# Patient Record
Sex: MALE | Race: WHITE | NOT HISPANIC OR LATINO | Employment: FULL TIME | URBAN - METROPOLITAN AREA
[De-identification: names, ages, dates, MRNs, and addresses within clinical notes are randomized per-mention and may not be internally consistent; named-entity substitution may affect disease eponyms.]

---

## 2019-06-10 ENCOUNTER — OFFICE VISIT (OUTPATIENT)
Dept: FAMILY MEDICINE | Facility: CLINIC | Age: 24
End: 2019-06-10
Payer: COMMERCIAL

## 2019-06-10 VITALS
HEART RATE: 62 BPM | OXYGEN SATURATION: 99 % | TEMPERATURE: 98.2 F | DIASTOLIC BLOOD PRESSURE: 93 MMHG | WEIGHT: 200.5 LBS | SYSTOLIC BLOOD PRESSURE: 153 MMHG

## 2019-06-10 DIAGNOSIS — F32.A DEPRESSION, UNSPECIFIED DEPRESSION TYPE: Primary | ICD-10-CM

## 2019-06-10 DIAGNOSIS — R03.0 ELEVATED BLOOD PRESSURE READING WITHOUT DIAGNOSIS OF HYPERTENSION: ICD-10-CM

## 2019-06-10 RX ORDER — BUPROPION HYDROCHLORIDE 150 MG/1
150 TABLET ORAL EVERY MORNING
Qty: 30 TABLET | Refills: 1 | Status: SHIPPED | OUTPATIENT
Start: 2019-06-10 | End: 2019-07-03

## 2019-06-10 ASSESSMENT — ANXIETY QUESTIONNAIRES
2. NOT BEING ABLE TO STOP OR CONTROL WORRYING: MORE THAN HALF THE DAYS
7. FEELING AFRAID AS IF SOMETHING AWFUL MIGHT HAPPEN: MORE THAN HALF THE DAYS
1. FEELING NERVOUS, ANXIOUS, OR ON EDGE: MORE THAN HALF THE DAYS
5. BEING SO RESTLESS THAT IT IS HARD TO SIT STILL: NEARLY EVERY DAY
IF YOU CHECKED OFF ANY PROBLEMS ON THIS QUESTIONNAIRE, HOW DIFFICULT HAVE THESE PROBLEMS MADE IT FOR YOU TO DO YOUR WORK, TAKE CARE OF THINGS AT HOME, OR GET ALONG WITH OTHER PEOPLE: VERY DIFFICULT
GAD7 TOTAL SCORE: 14
3. WORRYING TOO MUCH ABOUT DIFFERENT THINGS: SEVERAL DAYS
6. BECOMING EASILY ANNOYED OR IRRITABLE: MORE THAN HALF THE DAYS

## 2019-06-10 ASSESSMENT — PAIN SCALES - GENERAL: PAINLEVEL: NO PAIN (0)

## 2019-06-10 ASSESSMENT — PATIENT HEALTH QUESTIONNAIRE - PHQ9
SUM OF ALL RESPONSES TO PHQ QUESTIONS 1-9: 15
5. POOR APPETITE OR OVEREATING: MORE THAN HALF THE DAYS

## 2019-06-10 NOTE — PROGRESS NOTES
HPI       Sergio Harrington is a 23 year old male with a past medical history significant for asthma who presents for     Chief Complaint   Patient presents with     MOOD CHANGES     Pt is having ongoing mood changes.      Depression:  Sergio presents today to discuss depressed mood. 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 Reports he did visit the mental health clinic at [facility] during his undergraduate program for similar symptoms and he employed non-pharmacological therapies to manage depression at that time.     Sergio reports that his wife and his boss have noticed a change in his mood. Admits he wasn't spending as much time at work and his boss confronted him about it. While not at work he'll read, considers himself a bookworm, uses this to cope. Has tried yoga and meditation as well. Sergio admits that he has been hesitant to start medication until recently, he is open to it at this time. His wife struggles with depression as well and is on managed on bupropion. Sergio denies thoughts of self harm ot harm to others.     Sergio reports exercising 4-5 times weekly, usually includes cardio and weights. He sleeps 8 hours/night.     Elevated blood pressure:  Sergio reports previous high blood pressure readings at the eye doctor and dentist. He did have this evaluated about 2 months ago at which time the provider he saw recommended monitoring his BP at home. He acquired a home BP cuff and has been checking his BP at least three times weekly. Reports his readings are round 120/80.     Past Medical History:   Diagnosis Date     Asthma      Past Surgical History:   Procedure Laterality Date     HC TOOTH EXTRACTION W/FORCEP       Family History   Problem Relation Age of Onset     No Known Problems Mother      No Known Problems Father       No Known Problems Sister      Atrial fibrillation Maternal Grandmother      Pacemaker Maternal Grandmother      Melanoma Maternal Grandmother      Myocardial Infarction Maternal Grandfather      Kidney Disease Maternal Grandfather      Dementia Paternal Grandmother      Diabetes Type 2  Paternal Grandfather      Social History     Socioeconomic History     Marital status:      Spouse name: Arnold     Number of children: 0     Years of education: 17     Highest education level: Bachelor's degree (e.g., BA, AB, BS)   Occupational History     Not on file   Social Needs     Financial resource strain: Not on file     Food insecurity:     Worry: Not on file     Inability: Not on file     Transportation needs:     Medical: Not on file     Non-medical: Not on file   Tobacco Use     Smoking status: Never Smoker     Smokeless tobacco: Never Used   Substance and Sexual Activity     Alcohol use: Yes     Comment: 3-4 times monthly, 1-2 beers each time     Drug use: Never     Sexual activity: Yes     Partners: Female     Birth control/protection: IUD   Lifestyle     Physical activity:     Days per week: Not on file     Minutes per session: Not on file     Stress: Not on file   Relationships     Social connections:     Talks on phone: Not on file     Gets together: Not on file     Attends Yazidism service: Not on file     Active member of club or organization: Not on file     Attends meetings of clubs or organizations: Not on file     Relationship status: Not on file     Intimate partner violence:     Fear of current or ex partner: Not on file     Emotionally abused: Not on file     Physically abused: Not on file     Forced sexual activity: Not on file   Other Topics Concern     Not on file   Social History Narrative    Grew up in [place], [place].        Current Outpatient Medications   Medication     buPROPion (WELLBUTRIN XL) 150 MG 24 hr tablet     No current facility-administered medications for this visit.       No  Known Allergies      Problem, Medication and Allergy Lists were reviewed and updated if needed..    Patient is a new patient to this clinic and so  I reviewed/updated the Past Medical History, the Family History and the Social History .         Review of Systems:   Review of Systems     ROS: 10 point ROS neg other than the symptoms noted above in the HPI.         Physical Exam:     Vitals:    06/10/19 1306   BP: (!) 153/93   Pulse: 62   Temp: 98.2  F (36.8  C)   SpO2: 99%   Weight: 90.9 kg (200 lb 8 oz)     There is no height or weight on file to calculate BMI.  Vital signs normal except elevated blood pressure.     Physical Exam   Constitutional: He is oriented to person, place, and time. He appears well-developed and well-nourished. No distress.   Cardiovascular: Normal rate.   Pulmonary/Chest: Effort normal. No respiratory distress.   Neurological: He is alert and oriented to person, place, and time.   Flushing to neck noted, possibly secondary to anxiety while in clinic.    Skin: Skin is warm and dry.   Psychiatric: He has a normal mood and affect. His behavior is normal.       Results:     No laboratory testing was completed at today's visit.    Assessment and Plan      1. Depression, unspecified depression type  Comment: Pt with history of depressed mood previously managed with non-pharmacological therapies including yoga, meditation. 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 We discussed SSRIs today as well as bupropion. Through shared decision making, it was decided to start bupropion  mg daily with close follow-up.   Plan:   - buPROPion (WELLBUTRIN XL) 150 MG 24 hr tablet; Take 1 tablet (150 mg) by mouth every morning  Dispense: 30 tablet; Refill: 1   - Counseled on how to take this medication and possible adverse side effects   - Continue regular exercise   - Follow-up in 4 weeks, sooner if  concerns    2. Elevated blood pressure reading without diagnosis of hypertension  Comment: Pt with elevated BP reading today in clinic without prior history of hypertension. Reports this is not new for him and he recently had this evaluated in clinic be alternate provider who recommended home monitoring of BP. Pt has been checking BP three times weekly, readings reported to be around 120/80.   Plan:   - Continue to monitor BP at home   - Return to clinic if BP readings consistently >130/80    Follow-up: Return to clinic in 4 weeks for depression follow-up and medication management, sooner if concerns.      There are no discontinued medications.    Options for treatment and follow-up care were reviewed with the patient. Sergio Harrington  engaged in the decision making process and verbalized understanding of the options discussed and agreed with the final plan.    DARIA Read CNP

## 2019-06-10 NOTE — NURSING NOTE
23 year old  Chief Complaint   Patient presents with     MOOD CHANGES     Pt is having ongoing mood changes.        Blood pressure (!) 153/93, pulse 62, temperature 98.2  F (36.8  C), weight 90.9 kg (200 lb 8 oz), SpO2 99 %. There is no height or weight on file to calculate BMI.  BP completed using cuff size:    There is no problem list on file for this patient.      Wt Readings from Last 2 Encounters:   06/10/19 90.9 kg (200 lb 8 oz)     BP Readings from Last 3 Encounters:   06/10/19 (!) 153/93       No Known Allergies    No current outpatient medications on file.     No current facility-administered medications for this visit.        Social History     Tobacco Use     Smoking status: Never Smoker     Smokeless tobacco: Never Used   Substance Use Topics     Alcohol use: Yes     Drug use: None       Honoring Choices - Health Care Directive Guide offered to patient at time of visit.    Health Maintenance Due   Topic Date Due     PREVENTIVE CARE VISIT  1994     DTAP/TDAP/TD IMMUNIZATION (1 - Tdap) 10/10/2001     HIV SCREENING  10/10/2009     PHQ-2  01/01/2019         There is no immunization history on file for this patient.    No results found for: PAP      No lab results found.    PHQ-2 ( 1999 Pfizer) 6/10/2019   Q1: Little interest or pleasure in doing things 2   Q2: Feeling down, depressed or hopeless 1   PHQ-2 Score 3       No flowsheet data found.    No flowsheet data found.    No flowsheet data found.      Felisha De Leon, [facility]  Tameka 10, 2019 1:13 PM

## 2019-06-10 NOTE — PATIENT INSTRUCTIONS
[facility] Medication Refill Request Information:  * Please contact your pharmacy regarding ANY request for medication refills.  ** NP Clinic Prescription Fax = [phone]  * Please allow 3 business days for routine medication refills.  * Please allow 5 business days for controlled substance medication refills.     [facility] Test Result notification information:  *You will be notified with in 7-10 days of your appointment day regarding the results of your test.  If you are on MyChart you will be notified as soon as the provider has reviewed the results and signed off on them.    [facility]: [phone] or [phone] ([facility] clinic location)

## 2019-06-11 ASSESSMENT — ANXIETY QUESTIONNAIRES: GAD7 TOTAL SCORE: 14

## 2019-06-30 ASSESSMENT — ANXIETY QUESTIONNAIRES
7. FEELING AFRAID AS IF SOMETHING AWFUL MIGHT HAPPEN: NOT AT ALL
5. BEING SO RESTLESS THAT IT IS HARD TO SIT STILL: NOT AT ALL
1. FEELING NERVOUS, ANXIOUS, OR ON EDGE: SEVERAL DAYS
2. NOT BEING ABLE TO STOP OR CONTROL WORRYING: NOT AT ALL
4. TROUBLE RELAXING: SEVERAL DAYS
GAD7 TOTAL SCORE: 3
6. BECOMING EASILY ANNOYED OR IRRITABLE: NOT AT ALL
7. FEELING AFRAID AS IF SOMETHING AWFUL MIGHT HAPPEN: NOT AT ALL
GAD7 TOTAL SCORE: 3
3. WORRYING TOO MUCH ABOUT DIFFERENT THINGS: SEVERAL DAYS

## 2019-07-03 ENCOUNTER — OFFICE VISIT (OUTPATIENT)
Dept: FAMILY MEDICINE | Facility: CLINIC | Age: 24
End: 2019-07-03
Payer: COMMERCIAL

## 2019-07-03 VITALS
RESPIRATION RATE: 16 BRPM | HEART RATE: 90 BPM | SYSTOLIC BLOOD PRESSURE: 155 MMHG | OXYGEN SATURATION: 98 % | DIASTOLIC BLOOD PRESSURE: 80 MMHG | BODY MASS INDEX: 30.74 KG/M2 | TEMPERATURE: 98.3 F | HEIGHT: 68 IN | WEIGHT: 202.8 LBS

## 2019-07-03 DIAGNOSIS — R03.0 ELEVATED BLOOD PRESSURE READING WITHOUT DIAGNOSIS OF HYPERTENSION: ICD-10-CM

## 2019-07-03 DIAGNOSIS — F32.A DEPRESSION, UNSPECIFIED DEPRESSION TYPE: Primary | ICD-10-CM

## 2019-07-03 RX ORDER — BUPROPION HYDROCHLORIDE 300 MG/1
300 TABLET ORAL EVERY MORNING
Qty: 30 TABLET | Refills: 1 | Status: SHIPPED | OUTPATIENT
Start: 2019-07-03 | End: 2019-09-03 | Stop reason: DRUGHIGH

## 2019-07-03 ASSESSMENT — PATIENT HEALTH QUESTIONNAIRE - PHQ9
5. POOR APPETITE OR OVEREATING: SEVERAL DAYS
SUM OF ALL RESPONSES TO PHQ QUESTIONS 1-9: 4

## 2019-07-03 ASSESSMENT — ANXIETY QUESTIONNAIRES
7. FEELING AFRAID AS IF SOMETHING AWFUL MIGHT HAPPEN: NOT AT ALL
1. FEELING NERVOUS, ANXIOUS, OR ON EDGE: SEVERAL DAYS
5. BEING SO RESTLESS THAT IT IS HARD TO SIT STILL: NOT AT ALL
6. BECOMING EASILY ANNOYED OR IRRITABLE: SEVERAL DAYS
2. NOT BEING ABLE TO STOP OR CONTROL WORRYING: NOT AT ALL
GAD7 TOTAL SCORE: 4
IF YOU CHECKED OFF ANY PROBLEMS ON THIS QUESTIONNAIRE, HOW DIFFICULT HAVE THESE PROBLEMS MADE IT FOR YOU TO DO YOUR WORK, TAKE CARE OF THINGS AT HOME, OR GET ALONG WITH OTHER PEOPLE: SOMEWHAT DIFFICULT
3. WORRYING TOO MUCH ABOUT DIFFERENT THINGS: SEVERAL DAYS

## 2019-07-03 ASSESSMENT — PAIN SCALES - GENERAL: PAINLEVEL: NO PAIN (0)

## 2019-07-03 ASSESSMENT — MIFFLIN-ST. JEOR: SCORE: 1884.39

## 2019-07-03 NOTE — PROGRESS NOTES
HPI       Sergio Harrington is a 23 year old male with a past medical history significant for asthma and depression who presents for     Chief Complaint   Patient presents with     Recheck Medication     Pt is here for a follow up to discuss medications.     Sergio presents today for depression follow-up and medication management. Three weeks ago he was started on bupropion  mg daily.     Sergio reports he has been taking the medication as prescribed in the morning. He has not missed a dose. Notes that after initially starting the medication he had a difficult time falling asleep at night, laid awake longer than usual before falling asleep. This lasted about 3 nights. Denies ever having sleep disruptions since starting the medication. He generally goes to bed around 830-9 pm and wakes up at 530 am.      Sergio reports his depression seemed to notably improve for two weeks following initiation of bupropion, described as more talkative and friendly. His wife noticed an improvement as well. Over the past week he has noted a plateau or possibly some increased depression that is still better managed when compared to prior to starting medication. He describes his symptoms as irritability.     Patient reports work is going really well. 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 He has no concerns regarding his performance at work.    Elevated blood pressure:  Patient reports he has been checking his BP at home, readings are consistently <130/80.     PHQ-9 SCORE 6/10/2019   PHQ-9 Total Score 15     ALLYSSA-7 SCORE 6/10/2019 6/30/2019   Total Score - 3 (minimal anxiety)   Total Score 14 3       Past Medical History:   Diagnosis Date     Asthma      Depression      Current Outpatient Medications   Medication     buPROPion (WELLBUTRIN XL) 150 MG 24 hr tablet     No current facility-administered medications for this visit.       No Known Allergies      Problem, Medication and Allergy  "Lists were reviewed and updated if needed..    Patient is an established patient of this clinic..         Review of Systems:   Review of Systems     ROS: 10 point ROS neg other than the symptoms noted above in the HPI.         Physical Exam:     Vitals:    07/03/19 0826 07/03/19 0828   BP: 147/90 155/80   BP Location: Right arm    Patient Position: Sitting    Cuff Size: Adult Large    Pulse: 90    Resp: 16    Temp: 98.3  F (36.8  C)    TempSrc: Oral    SpO2: 98%    Weight: 92 kg (202 lb 12.8 oz)    Height: 1.727 m (5' 8\")      Body mass index is 30.84 kg/m .  Vital signs normal except elevated blood pressure.     Physical Exam   Constitutional: He is oriented to person, place, and time. He appears well-developed and well-nourished. No distress.   Cardiovascular: Normal rate, regular rhythm and normal heart sounds. Exam reveals no gallop and no friction rub.   No murmur heard.  Neurological: He is alert and oriented to person, place, and time.   Skin: Skin is warm and dry.   Psychiatric: He has a normal mood and affect. His behavior is normal.       Results:     No laboratory testing was completed at today's visit.    Assessment and Plan      1. Depression, unspecified depression type  Comment: Improving. Pt with hx of depression started on bupropion  mg three weeks ago, has been doing better. Reports symptoms improved for two weeks following initiation of medication but feels he has plateaued over the past week with possibly some increased depression. Pt is interested in a dose increase today. Plan to increase to bupropion  mg daily with follow-up in 4 weeks.   PHQ-9 SCORE 6/10/2019 7/3/2019   PHQ-9 Total Score 15 4     ALLYSSA-7 SCORE 6/10/2019 6/30/2019 7/3/2019   Total Score - 3 (minimal anxiety) -   Total Score 14 3 4   Plan:   - buPROPion (WELLBUTRIN XL) 300 MG 24 hr tablet; Take 1 tablet (300 mg) by mouth every morning  Dispense: 30 tablet; Refill: 1   - Monitor for sleep disruptions   - Monitor " depression symptoms   - Follow-up in 4 weeks, sooner if concerns    2. Elevated blood pressure reading without diagnosis of hypertension  Comment: Pt with hx of elevated BP readings while in clinic. Patient reports he has been checking his BP at home, readings are consistently <130/80.   Plan:   - Continue to monitor    Follow-up: Return to clinic in 4 weeks, sooner if concerns.     Medications Discontinued During This Encounter   Medication Reason     buPROPion (WELLBUTRIN XL) 150 MG 24 hr tablet Reorder     Options for treatment and follow-up care were reviewed with the patient. Sergio Harrington  engaged in the decision making process and verbalized understanding of the options discussed and agreed with the final plan.    DARIA Read CNP  Answers for HPI/ROS submitted by the patient on 6/30/2019   ALLYSSA 7 TOTAL SCORE: 3

## 2019-07-03 NOTE — NURSING NOTE
Chief Complaint   Patient presents with     Recheck Medication     Pt is here for a follow up to discuss medications.         Cyrus Hernández, EMT on 7/3/2019 at 8:26 AM

## 2019-07-03 NOTE — PATIENT INSTRUCTIONS

## 2019-09-03 ENCOUNTER — OFFICE VISIT (OUTPATIENT)
Dept: FAMILY MEDICINE | Facility: CLINIC | Age: 24
End: 2019-09-03
Payer: COMMERCIAL

## 2019-09-03 VITALS
TEMPERATURE: 98.9 F | OXYGEN SATURATION: 99 % | HEART RATE: 90 BPM | SYSTOLIC BLOOD PRESSURE: 177 MMHG | BODY MASS INDEX: 31.39 KG/M2 | HEIGHT: 67 IN | WEIGHT: 200 LBS | RESPIRATION RATE: 16 BRPM | DIASTOLIC BLOOD PRESSURE: 103 MMHG

## 2019-09-03 DIAGNOSIS — R03.0 WHITE COAT SYNDROME WITHOUT DIAGNOSIS OF HYPERTENSION: ICD-10-CM

## 2019-09-03 DIAGNOSIS — F32.A DEPRESSION, UNSPECIFIED DEPRESSION TYPE: Primary | ICD-10-CM

## 2019-09-03 RX ORDER — BUPROPION HYDROCHLORIDE 450 MG/1
450 TABLET, FILM COATED, EXTENDED RELEASE ORAL DAILY
Qty: 14 TABLET | Refills: 0 | Status: SHIPPED | OUTPATIENT
Start: 2019-09-03 | End: 2019-09-16

## 2019-09-03 ASSESSMENT — ENCOUNTER SYMPTOMS
SHORTNESS OF BREATH: 0
CHEST TIGHTNESS: 0
SLEEP DISTURBANCE: 0
HEADACHES: 0
ACTIVITY CHANGE: 0
NERVOUS/ANXIOUS: 0
PALPITATIONS: 0
DIZZINESS: 0
SEIZURES: 0
LIGHT-HEADEDNESS: 0

## 2019-09-03 ASSESSMENT — ANXIETY QUESTIONNAIRES
GAD7 TOTAL SCORE: 4
IF YOU CHECKED OFF ANY PROBLEMS ON THIS QUESTIONNAIRE, HOW DIFFICULT HAVE THESE PROBLEMS MADE IT FOR YOU TO DO YOUR WORK, TAKE CARE OF THINGS AT HOME, OR GET ALONG WITH OTHER PEOPLE: SOMEWHAT DIFFICULT
2. NOT BEING ABLE TO STOP OR CONTROL WORRYING: NOT AT ALL
1. FEELING NERVOUS, ANXIOUS, OR ON EDGE: SEVERAL DAYS
7. FEELING AFRAID AS IF SOMETHING AWFUL MIGHT HAPPEN: NOT AT ALL
5. BEING SO RESTLESS THAT IT IS HARD TO SIT STILL: NOT AT ALL
3. WORRYING TOO MUCH ABOUT DIFFERENT THINGS: SEVERAL DAYS
6. BECOMING EASILY ANNOYED OR IRRITABLE: SEVERAL DAYS

## 2019-09-03 ASSESSMENT — PATIENT HEALTH QUESTIONNAIRE - PHQ9
5. POOR APPETITE OR OVEREATING: SEVERAL DAYS
SUM OF ALL RESPONSES TO PHQ QUESTIONS 1-9: 4

## 2019-09-03 ASSESSMENT — MIFFLIN-ST. JEOR: SCORE: 1858.99

## 2019-09-03 NOTE — NURSING NOTE
"23 year old  Chief Complaint   Patient presents with     Depression     F/U       Blood pressure (!) 177/103, pulse 90, temperature 98.9  F (37.2  C), temperature source Oral, resp. rate 16, height 1.707 m (5' 7.2\"), weight 90.7 kg (200 lb), SpO2 99 %. Body mass index is 31.14 kg/m .  BP completed using cuff size:    There is no problem list on file for this patient.      Wt Readings from Last 2 Encounters:   09/03/19 90.7 kg (200 lb)   07/03/19 92 kg (202 lb 12.8 oz)     BP Readings from Last 3 Encounters:   09/03/19 (!) 177/103   07/03/19 155/80   06/10/19 (!) 153/93       No Known Allergies    Current Outpatient Medications   Medication     buPROPion (WELLBUTRIN XL) 300 MG 24 hr tablet     No current facility-administered medications for this visit.        Social History     Tobacco Use     Smoking status: Never Smoker     Smokeless tobacco: Never Used   Substance Use Topics     Alcohol use: Yes     Comment: 3-4 times monthly, 1-2 beers each time     Drug use: Never       Honoring Choices - Health Care Directive Guide offered to patient at time of visit.    Health Maintenance Due   Topic Date Due     PREVENTIVE CARE VISIT  1994     DEPRESSION ACTION PLAN  1994     DTAP/TDAP/TD IMMUNIZATION (1 - Tdap) 10/10/2001     HIV SCREENING  10/10/2009     INFLUENZA VACCINE (1) 09/01/2019         There is no immunization history on file for this patient.    No results found for: PAP      No lab results found.    PHQ-2 ( 1999 Pfizer) 9/3/2019 6/30/2019   Q1: Little interest or pleasure in doing things 1 1   Q2: Feeling down, depressed or hopeless 0 0   PHQ-2 Score 1 1   Q1: Little interest or pleasure in doing things - Several days   Q2: Feeling down, depressed or hopeless - Not at all   PHQ-2 Score - 1       PHQ-9 SCORE 6/10/2019 7/3/2019 9/3/2019   PHQ-9 Total Score 15 4 4       ALLYSSA-7 SCORE 6/30/2019 7/3/2019 9/3/2019   Total Score 3 (minimal anxiety) - -   Total Score 3 4 4       No flowsheet data " found.    Amanda Alcala, [facility]  September 3, 2019 3:22 PM

## 2019-09-03 NOTE — PROGRESS NOTES
HPI       Sergio Harrington is a 23 year old male with a past medical history significant for depression and asthma who presents for     Chief Complaint   Patient presents with     Depression     F/U     Depression:  Sergio presents today for depression follow-up and medication management. On 6/10/19 (9 weeks ago) he was started on bupropion  mg daily, dose was increased to 300 mg daily on 7/3/19 (4 weeks ago). Overall, he is very pleased with his response to the bupropion. He takes the medication everyday as prescribed and denies adverse side effects. Over the past week he has been noting lethargy in the afternoon around 2-3 pm. He wonders if this might be related to the recent start of the semester and his work load changing secondary to this. He usually gets up at 5 am and goes to bed around 9 pm. He maintains this schedule 7 days/week. He gets 7 hours of sleep/night, denies sleep disruptions. He drinks 1-2 cups coffee daily, never drinks caffeine after 12 pm. He continue to exercise 3-4 times weekly, usually consists of resistance training and walking.     PHQ-9 SCORE 6/10/2019 7/3/2019 9/3/2019   PHQ-9 Total Score 15 4 4     ALLYSSA-7 SCORE 6/30/2019 7/3/2019 9/3/2019   Total Score 3 (minimal anxiety) - -   Total Score 3 4 4       White coat hypertension:  Sergio continues to check his blood pressure regularly at home, reports readings are consistently ranging 120/low 80's.      BP Readings from Last 6 Encounters:   09/03/19 (!) 177/103   07/03/19 155/80   06/10/19 (!) 153/93       Past Medical History:   Diagnosis Date     Asthma      Depression      Current Outpatient Medications   Medication     buPROPion (WELLBUTRIN XL) 300 MG 24 hr tablet     No current facility-administered medications for this visit.       No Known Allergies      Problem, Medication and Allergy Lists were reviewed and updated if needed..    Patient is an established patient of this clinic..         Review of Systems:   Review of  "Systems   Constitutional: Negative for activity change.   Respiratory: Negative for chest tightness and shortness of breath.    Cardiovascular: Negative for chest pain and palpitations.   Neurological: Negative for dizziness, seizures, light-headedness and headaches.   Psychiatric/Behavioral: Negative for sleep disturbance. The patient is not nervous/anxious.           Physical Exam:     Vitals:    09/03/19 1520   BP: (!) 177/103   BP Location: Left arm   Patient Position: Chair   Cuff Size: Adult Regular   Pulse: 90   Resp: 16   Temp: 98.9  F (37.2  C)   TempSrc: Oral   SpO2: 99%   Weight: 90.7 kg (200 lb)   Height: 1.707 m (5' 7.2\")     Body mass index is 31.14 kg/m .  Vital signs normal except hypertension, history of white coat hypertension.      Physical Exam   Constitutional: He is oriented to person, place, and time. He appears well-developed and well-nourished. No distress.   Cardiovascular: Normal rate, regular rhythm and normal heart sounds. Exam reveals no gallop and no friction rub.   No murmur heard.  Pulmonary/Chest: Effort normal. No respiratory distress.   Neurological: He is alert and oriented to person, place, and time.   Skin: Skin is warm and dry.   Psychiatric: He has a normal mood and affect. His behavior is normal.       Results:     No laboratory testing was completed at today's visit.    Assessment and Plan      1. Depression, unspecified depression type  Comment: Improved. Pt started on bupropion  mg daily 9 weeks ago, dose increased to 300 mg daily 4 weeks ago. Overall, pt is pleased with response to medication but feels he has room for improvement and is interested in increasing his dose. Plan to increase to bupropion  mg daily, will plan to try this for 1-2 weeks. If he is pleased with his response, will continue at 450 mg daily and refills will be provided at that time. If he has any concerns, he is to contact clinic and can decrease back down to 300 mg daily.    Plan:   - " buPROPion HCl ER, XL, 450 MG TB24; Take 450 mg by mouth daily  Dispense: 14 tablet; Refill: 0   - Patient to contact clinic in 1-2 weeks to update on how things are going on increased dose, will provide refills at that time    2. White coat syndrome without diagnosis of hypertension  Comment: Pt continues to check his blood pressure regularly at home, reports readings are consistently ranging 120/low 80's.  Cardiac exam today is unremarkable.  Plan:   - Continue to monitor BP at home, return to clinic if BP readings consistently >130/80      Follow-up: Contact clinic via telephone or MyChart in 1-2 weeks to provide update, sooner if concerns.      Medications Discontinued During This Encounter   Medication Reason     buPROPion (WELLBUTRIN XL) 300 MG 24 hr tablet Dose adjustment       Options for treatment and follow-up care were reviewed with the patient. Sergio Harrington  engaged in the decision making process and verbalized understanding of the options discussed and agreed with the final plan.    DARIA Read CNP

## 2019-09-04 ASSESSMENT — ANXIETY QUESTIONNAIRES: GAD7 TOTAL SCORE: 4

## 2019-09-16 DIAGNOSIS — F33.9 EPISODE OF RECURRENT MAJOR DEPRESSIVE DISORDER, UNSPECIFIED DEPRESSION EPISODE SEVERITY (H): Primary | ICD-10-CM

## 2019-09-16 RX ORDER — BUPROPION HYDROCHLORIDE 150 MG/1
150 TABLET ORAL EVERY MORNING
Qty: 30 TABLET | Refills: 3 | Status: SHIPPED | OUTPATIENT
Start: 2019-09-16 | End: 2020-01-24

## 2019-09-16 RX ORDER — BUPROPION HYDROCHLORIDE 300 MG/1
300 TABLET ORAL EVERY MORNING
Qty: 30 TABLET | Refills: 3 | Status: SHIPPED | OUTPATIENT
Start: 2019-09-16 | End: 2020-01-24

## 2020-01-24 ENCOUNTER — OFFICE VISIT (OUTPATIENT)
Dept: FAMILY MEDICINE | Facility: CLINIC | Age: 25
End: 2020-01-24
Payer: COMMERCIAL

## 2020-01-24 VITALS
OXYGEN SATURATION: 100 % | DIASTOLIC BLOOD PRESSURE: 96 MMHG | SYSTOLIC BLOOD PRESSURE: 158 MMHG | TEMPERATURE: 98.5 F | HEART RATE: 83 BPM | WEIGHT: 207.8 LBS | BODY MASS INDEX: 31.49 KG/M2 | HEIGHT: 68 IN

## 2020-01-24 DIAGNOSIS — R03.0 WHITE COAT SYNDROME WITHOUT HYPERTENSION: ICD-10-CM

## 2020-01-24 DIAGNOSIS — F33.9 EPISODE OF RECURRENT MAJOR DEPRESSIVE DISORDER, UNSPECIFIED DEPRESSION EPISODE SEVERITY (H): Primary | ICD-10-CM

## 2020-01-24 LAB
ALBUMIN SERPL-MCNC: 4.8 G/DL (ref 3.4–5)
ALP SERPL-CCNC: 50 U/L (ref 40–150)
ALT SERPL W P-5'-P-CCNC: 28 U/L (ref 0–70)
ANION GAP SERPL CALCULATED.3IONS-SCNC: 4 MMOL/L (ref 3–14)
AST SERPL W P-5'-P-CCNC: 18 U/L (ref 0–45)
BILIRUB SERPL-MCNC: 0.8 MG/DL (ref 0.2–1.3)
BUN SERPL-MCNC: 22 MG/DL (ref 7–30)
CALCIUM SERPL-MCNC: 9.2 MG/DL (ref 8.5–10.1)
CHLORIDE SERPL-SCNC: 104 MMOL/L (ref 94–109)
CO2 SERPL-SCNC: 29 MMOL/L (ref 20–32)
CREAT SERPL-MCNC: 1.01 MG/DL (ref 0.66–1.25)
GFR SERPL CREATININE-BSD FRML MDRD: >90 ML/MIN/{1.73_M2}
GLUCOSE SERPL-MCNC: 79 MG/DL (ref 70–99)
POTASSIUM SERPL-SCNC: 3.8 MMOL/L (ref 3.4–5.3)
PROT SERPL-MCNC: 7.9 G/DL (ref 6.8–8.8)
SODIUM SERPL-SCNC: 137 MMOL/L (ref 133–144)

## 2020-01-24 RX ORDER — BUPROPION HYDROCHLORIDE 450 MG/1
150 TABLET, FILM COATED, EXTENDED RELEASE ORAL EVERY MORNING
Qty: 90 TABLET | Refills: 1 | Status: SHIPPED | OUTPATIENT
Start: 2020-01-24 | End: 2020-04-06

## 2020-01-24 RX ORDER — BUPROPION HYDROCHLORIDE 150 MG/1
150 TABLET ORAL EVERY MORNING
Qty: 30 TABLET | Refills: 3 | Status: SHIPPED | OUTPATIENT
Start: 2020-01-24 | End: 2020-01-24

## 2020-01-24 RX ORDER — BUPROPION HYDROCHLORIDE 300 MG/1
300 TABLET ORAL EVERY MORNING
Qty: 30 TABLET | Refills: 3 | Status: SHIPPED | OUTPATIENT
Start: 2020-01-24 | End: 2020-01-24

## 2020-01-24 ASSESSMENT — ENCOUNTER SYMPTOMS
SLEEP DISTURBANCE: 0
CHEST TIGHTNESS: 0
FATIGUE: 0
WHEEZING: 0
SHORTNESS OF BREATH: 0
COUGH: 0
DECREASED CONCENTRATION: 0
NAUSEA: 0
DYSPHORIC MOOD: 0
PALPITATIONS: 0
NERVOUS/ANXIOUS: 0
DIARRHEA: 0
VOMITING: 0

## 2020-01-24 ASSESSMENT — ANXIETY QUESTIONNAIRES
2. NOT BEING ABLE TO STOP OR CONTROL WORRYING: SEVERAL DAYS
3. WORRYING TOO MUCH ABOUT DIFFERENT THINGS: SEVERAL DAYS
IF YOU CHECKED OFF ANY PROBLEMS ON THIS QUESTIONNAIRE, HOW DIFFICULT HAVE THESE PROBLEMS MADE IT FOR YOU TO DO YOUR WORK, TAKE CARE OF THINGS AT HOME, OR GET ALONG WITH OTHER PEOPLE: SOMEWHAT DIFFICULT
5. BEING SO RESTLESS THAT IT IS HARD TO SIT STILL: MORE THAN HALF THE DAYS
GAD7 TOTAL SCORE: 7
7. FEELING AFRAID AS IF SOMETHING AWFUL MIGHT HAPPEN: NOT AT ALL
1. FEELING NERVOUS, ANXIOUS, OR ON EDGE: SEVERAL DAYS
6. BECOMING EASILY ANNOYED OR IRRITABLE: SEVERAL DAYS

## 2020-01-24 ASSESSMENT — PATIENT HEALTH QUESTIONNAIRE - PHQ9
5. POOR APPETITE OR OVEREATING: SEVERAL DAYS
SUM OF ALL RESPONSES TO PHQ QUESTIONS 1-9: 8

## 2020-01-24 ASSESSMENT — MIFFLIN-ST. JEOR: SCORE: 1894.13

## 2020-01-24 NOTE — NURSING NOTE
"24 year old  Chief Complaint   Patient presents with     Depression     Review to see if he still wants to take the medication, feels like its helped a lot,        Blood pressure (!) 175/105, pulse 83, temperature 98.5  F (36.9  C), temperature source Oral, height 1.715 m (5' 7.5\"), weight 94.3 kg (207 lb 12.8 oz), SpO2 100 %. Body mass index is 32.07 kg/m .  BP completed using cuff size:    There is no problem list on file for this patient.      Wt Readings from Last 2 Encounters:   01/24/20 94.3 kg (207 lb 12.8 oz)   09/03/19 90.7 kg (200 lb)     BP Readings from Last 3 Encounters:   01/24/20 (!) 175/105   09/03/19 (!) 177/103   07/03/19 155/80       No Known Allergies    Current Outpatient Medications   Medication     buPROPion (WELLBUTRIN XL) 150 MG 24 hr tablet     buPROPion (WELLBUTRIN XL) 300 MG 24 hr tablet     No current facility-administered medications for this visit.        Social History     Tobacco Use     Smoking status: Never Smoker     Smokeless tobacco: Never Used   Substance Use Topics     Alcohol use: Yes     Comment: 3-4 times monthly, 1-2 beers each time     Drug use: Never       Honoring Choices - Health Care Directive Guide offered to patient at time of visit.    Health Maintenance Due   Topic Date Due     PREVENTIVE CARE VISIT  1994     DEPRESSION ACTION PLAN  1994     DTAP/TDAP/TD IMMUNIZATION (1 - Tdap) 10/10/2005     HIV SCREENING  10/10/2009         There is no immunization history on file for this patient.    No results found for: PAP      No lab results found.    PHQ-2 ( 1999 Pfizer) 9/3/2019 6/30/2019   Q1: Little interest or pleasure in doing things 1 1   Q2: Feeling down, depressed or hopeless 0 0   PHQ-2 Score 1 1   Q1: Little interest or pleasure in doing things - Several days   Q2: Feeling down, depressed or hopeless - Not at all   PHQ-2 Score - 1       PHQ-9 SCORE 6/10/2019 7/3/2019 9/3/2019 1/24/2020   PHQ-9 Total Score 15 4 4 8       ALLYSSA-7 SCORE 7/3/2019 9/3/2019 " 1/24/2020   Total Score - - -   Total Score 4 4 7       No flowsheet data found.      Mary Ware, [facility]  January 24, 2020 1:17 PM

## 2020-01-24 NOTE — PROGRESS NOTES
"       HPI       Sergio Harrington is a 24 year old male with PMHx of depression, white coat syndrome without hypertension, and asthma who presents for   Chief Complaint   Patient presents with     Depression     Review to see if he still wants to take the medication, feels like its helped a lot,      Sergio has been taking 450mg of Wellbutrin XL since September 2019. Reports originally had to \"ride out\" the first month of side effects, however since then feels as though he is doing significantly better. 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     Denies any side effects currently, home life is going well and is continuing to exercise regularly throughout the week. Denies any known previous seizure activity. No thoughts of self-harm or harm to others. 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 Continues to monitor his BP at home twice weekly and consistently runs in the 120s/80s. Denies any angina, dyspnea, headaches, dizziness, lightheadedness, syncope.     PHQ-9 SCORE 7/3/2019 9/3/2019 1/24/2020   PHQ-9 Total Score 4 4 8     ALLYSSA-7 SCORE 7/3/2019 9/3/2019 1/24/2020   Total Score - - -   Total Score 4 4 7     Problem, Medication and Allergy Lists were       Current Outpatient Medications   Medication Sig Dispense Refill     buPROPion 450 MG TB24 Take 150 mg by mouth every morning 90 tablet 1       No Known Allergies.    Patient is an established patient of this clinic..         Review of Systems:   Review of Systems   Constitutional: Negative for fatigue.   Respiratory: Negative for cough, chest tightness, shortness of breath and wheezing.    Cardiovascular: Negative for chest pain, palpitations and leg swelling.   Gastrointestinal: Negative for diarrhea, nausea and vomiting.   Psychiatric/Behavioral: Negative for behavioral problems, decreased concentration, dysphoric mood, " "self-injury, sleep disturbance and suicidal ideas. The patient is not nervous/anxious.    All other systems reviewed and are negative.           Physical Exam:     Vitals:    01/24/20 1316 01/24/20 1337   BP: (!) 175/105 (!) 158/96   Pulse: 83    Temp: 98.5  F (36.9  C)    TempSrc: Oral    SpO2: 100%    Weight: 94.3 kg (207 lb 12.8 oz)    Height: 1.715 m (5' 7.5\")      Body mass index is 32.07 kg/m .  Vitals were reviewed and were normal     Physical Exam  Vitals signs and nursing note reviewed.   Constitutional:       General: He is not in acute distress.     Appearance: Normal appearance. He is normal weight. He is not ill-appearing, toxic-appearing or diaphoretic.   HENT:      Head: Normocephalic and atraumatic.      Mouth/Throat:      Mouth: Mucous membranes are moist.   Eyes:      General:         Right eye: No discharge.         Left eye: No discharge.      Conjunctiva/sclera: Conjunctivae normal.   Neck:      Musculoskeletal: Normal range of motion and neck supple. No neck rigidity or muscular tenderness.      Vascular: No carotid bruit.   Cardiovascular:      Pulses: Normal pulses.      Heart sounds: Normal heart sounds. No murmur. No friction rub. No gallop.    Pulmonary:      Effort: Pulmonary effort is normal. No respiratory distress.      Breath sounds: Normal breath sounds. No stridor. No wheezing, rhonchi or rales.   Chest:      Chest wall: No tenderness.   Lymphadenopathy:      Cervical: No cervical adenopathy.   Skin:     General: Skin is warm and dry.      Capillary Refill: Capillary refill takes less than 2 seconds.      Coloration: Skin is not jaundiced or pale.      Findings: No bruising, erythema, lesion or rash.   Neurological:      General: No focal deficit present.      Mental Status: He is alert and oriented to person, place, and time. Mental status is at baseline.      Motor: No weakness.      Coordination: Coordination normal.      Gait: Gait normal.   Psychiatric:         Mood and Affect: " Mood normal.         Thought Content: Thought content normal.         Results:   Results are ordered and pending  [facility]  Assessment and Plan        1. Episode of recurrent major depressive disorder, unspecified depression episode severity (H)  Chronic condition: tolerating Wellbutrin well with minimal side effects. PHQ9 and GAD7 scoring today indicate slight increase in symptoms, however patient endorses overall feeling significantly better than when he started his medication. Would like to continue with current dosing and requested medication be sent in 450mg tablets rather than split dosing of 150mg+300mg.   - Comprehensive metabolic panel - Results > 1hr  - buPROPion 450 MG TB24; Take 150 mg by mouth every morning  Dispense: 90 tablet; Refill: 1    2. White coat syndrome without hypertension  Known history of elevated BP readings at clinic. Owns BP monitor at home and checks twice weekly religiously. Endorses all readings have been <130/80mmHg and is asymptomatic in clinic today. Continue to monitor, reviewed with patient red flag symptoms of when to return to clinic for blood pressure. Verbalized understanding.   - Comprehensive metabolic panel - Results > 1hr       There are no discontinued medications.    Options for treatment and follow-up care were reviewed with the patient. Sergio Harrington  engaged in the decision making process and verbalized understanding of the options discussed and agreed with the final plan.    DARIA Sullivan CNP

## 2020-01-25 ASSESSMENT — ANXIETY QUESTIONNAIRES: GAD7 TOTAL SCORE: 7

## 2020-03-11 ENCOUNTER — HEALTH MAINTENANCE LETTER (OUTPATIENT)
Age: 25
End: 2020-03-11

## 2020-04-06 ENCOUNTER — VIRTUAL VISIT (OUTPATIENT)
Dept: FAMILY MEDICINE | Facility: CLINIC | Age: 25
End: 2020-04-06
Payer: COMMERCIAL

## 2020-04-06 DIAGNOSIS — F90.2 ATTENTION DEFICIT HYPERACTIVITY DISORDER (ADHD), COMBINED TYPE: ICD-10-CM

## 2020-04-06 DIAGNOSIS — F41.1 GENERALIZED ANXIETY DISORDER: ICD-10-CM

## 2020-04-06 DIAGNOSIS — R41.840 CONCENTRATION DEFICIT: Primary | ICD-10-CM

## 2020-04-06 ASSESSMENT — ANXIETY QUESTIONNAIRES
6. BECOMING EASILY ANNOYED OR IRRITABLE: MORE THAN HALF THE DAYS
3. WORRYING TOO MUCH ABOUT DIFFERENT THINGS: SEVERAL DAYS
1. FEELING NERVOUS, ANXIOUS, OR ON EDGE: NOT AT ALL
IF YOU CHECKED OFF ANY PROBLEMS ON THIS QUESTIONNAIRE, HOW DIFFICULT HAVE THESE PROBLEMS MADE IT FOR YOU TO DO YOUR WORK, TAKE CARE OF THINGS AT HOME, OR GET ALONG WITH OTHER PEOPLE: SOMEWHAT DIFFICULT
7. FEELING AFRAID AS IF SOMETHING AWFUL MIGHT HAPPEN: NOT AT ALL
5. BEING SO RESTLESS THAT IT IS HARD TO SIT STILL: MORE THAN HALF THE DAYS
2. NOT BEING ABLE TO STOP OR CONTROL WORRYING: NOT AT ALL
GAD7 TOTAL SCORE: 6

## 2020-04-06 ASSESSMENT — PATIENT HEALTH QUESTIONNAIRE - PHQ9
SUM OF ALL RESPONSES TO PHQ QUESTIONS 1-9: 8
5. POOR APPETITE OR OVEREATING: SEVERAL DAYS

## 2020-04-06 NOTE — NURSING NOTE
24 year old  Chief Complaint   Patient presents with     Consult     Pt. would like to discuss going on ADHD medication       There were no vitals taken for this visit. There is no height or weight on file to calculate BMI.  BP completed using cuff size:    There is no problem list on file for this patient.      Wt Readings from Last 2 Encounters:   01/24/20 94.3 kg (207 lb 12.8 oz)   09/03/19 90.7 kg (200 lb)     BP Readings from Last 3 Encounters:   01/24/20 (!) 158/96   09/03/19 (!) 177/103   07/03/19 155/80       No Known Allergies    Current Outpatient Medications   Medication     buPROPion 450 MG TB24     No current facility-administered medications for this visit.        Social History     Tobacco Use     Smoking status: Never Smoker     Smokeless tobacco: Never Used   Substance Use Topics     Alcohol use: Yes     Comment: 3-4 times monthly, 1-2 beers each time     Drug use: Never       Honoring Choices - Health Care Directive Guide offered to patient at time of visit.    Health Maintenance Due   Topic Date Due     PREVENTIVE CARE VISIT  1995     DEPRESSION ACTION PLAN  1995     DTAP/TDAP/TD IMMUNIZATION (1 - Tdap) 10/10/2005     HIV SCREENING  10/10/2009         There is no immunization history on file for this patient.    No results found for: PAP      Recent Labs   Lab Test 01/24/20  1323   ALT 28   CR 1.01   GFRESTIMATED >90   GFRESTBLACK >90   ALBUMIN 4.8   POTASSIUM 3.8       PHQ-2 ( 1999 Pfizer) 4/6/2020 9/3/2019   Q1: Little interest or pleasure in doing things 1 1   Q2: Feeling down, depressed or hopeless 0 0   PHQ-2 Score 1 1   Q1: Little interest or pleasure in doing things - -   Q2: Feeling down, depressed or hopeless - -   PHQ-2 Score - -       PHQ-9 SCORE 7/3/2019 9/3/2019 1/24/2020 4/6/2020   PHQ-9 Total Score 4 4 8 8       ALLYSSA-7 SCORE 9/3/2019 1/24/2020 4/6/2020   Total Score - - -   Total Score 4 7 6       No flowsheet data found.    Amanda Alcala CMA  April 6, 2020 1:30 PM

## 2020-04-06 NOTE — PROGRESS NOTES
"Sergio Harrington is a 24 year old male who is being evaluated via a billable video visit.      The patient has been notified of following:     \"This video visit will be conducted via a call between you and your physician/provider. We have found that certain health care needs can be provided without the need for an in-person physical exam.  This service lets us provide the care you need with a video conversation.  If a prescription is necessary we can send it directly to your pharmacy.  If lab work is needed we can place an order for that and you can then stop by our lab to have the test done at a later time.    If during the course of the call the physician/provider feels a video visit is not appropriate, you will not be charged for this service.\"     Patient has given verbal consent for Video visit? Yes    Patient would like the video invitation sent by: Send to e-mail at: [email or web address]    Subjective     Sergio Harrington is a 24 year old male , established patient who presents for a video visit today for the following health issues:he has a history of anxiety and depression. He has taken Wellbutrin in the past, however it has not been helping him recently so he stopped taking it two weeks ago. He had Neuropsych testing completed on April 1 with Dr. Neri Gamboa. He was diagnosed with ADHD and that assessment tool and diagnosis will be sent to our office so it can be a part of his record. He is interested in considering a stimulant medication to treat his symptoms.      HPI *Healthy 24 year old with history of anxiety and depression.   Video Start Time: 1330  Reviewed and updated as needed this visit by Provider    Review of Systems   ROS COMP: Constitutional, HEENT, cardiovascular, pulmonary, gi and gu systems are negative, except as otherwise noted.      Objective    There were no vitals taken for this visit.  Estimated body mass index is 32.07 kg/m  as calculated from the following:    Height as of 1/24/20: 1.715 " "m (5' 7.5\").    Weight as of 1/24/20: 94.3 kg (207 lb 12.8 oz).  Physical Exam   GENERAL: healthy, alert and no distress    ALLYSSA-7 SCORE 9/3/2019 1/24/2020 4/6/2020   Total Score - - -   Total Score 4 7 6       PHQ 9/3/2019 1/24/2020 4/6/2020   PHQ-9 Total Score 4 8 8   Q9: Thoughts of better off dead/self-harm past 2 weeks Not at all Not at all Not at all     Diagnostic Test Results:  [facility] so that it can be part of his chart  Assessment & Plan       ICD-10-CM    1. Concentration deficit  R41.840    2. Generalized anxiety disorder  F41.1      BMI:   Estimated body mass index is 32.07 kg/m  as calculated from the following:    Height as of 1/24/20: 1.715 m (5' 7.5\").    Weight as of 1/24/20: 94.3 kg (207 lb 12.8 oz).     First, please have a copy of Neuropsych testing sent to our clinic. Next, I will order adderall XR. Next, we will follow up in 2 weeks. He verbalizes understanding of the plan.     Return in about 3 weeks (around 7/6/2020) for Follow up after taking adderall for 2 weeks.    DARIA Mcdaniel, EDIS  Video-Visit Details    Type of service:  Video Visit    Video End Time (time video stopped): 1355    Originating Location (pt. Location): Home    Distant Location (provider location): [facility] Clinic, [facility]    Mode of Communication:  Video Conference via [employer]    I spent a total of 25 minutes face-to-face with Sergio during today's visit.  Over 50% of this time was spent counseling the patient and/or coordinating care regarding their care.  See note for details.      DARIA Mcdaniel, TERESA  Addendum 04/08/2020 1545:See scanned letter from Maninder Fernández. The letter states that Sergio meets the criteria after testing for ADHD, combined type, moderate severity. I will now order the Adderall XR and Sergio will follow up with me in 2 weeks.   DARIA Mcdaniel, CNP      "

## 2020-04-07 ASSESSMENT — ANXIETY QUESTIONNAIRES: GAD7 TOTAL SCORE: 6

## 2020-04-08 RX ORDER — DEXTROAMPHETAMINE SACCHARATE, AMPHETAMINE ASPARTATE MONOHYDRATE, DEXTROAMPHETAMINE SULFATE AND AMPHETAMINE SULFATE 2.5; 2.5; 2.5; 2.5 MG/1; MG/1; MG/1; MG/1
10 CAPSULE, EXTENDED RELEASE ORAL DAILY
Qty: 30 CAPSULE | Refills: 0 | Status: SHIPPED | OUTPATIENT
Start: 2020-04-08 | End: 2020-04-29 | Stop reason: DRUGHIGH

## 2020-04-29 ENCOUNTER — VIRTUAL VISIT (OUTPATIENT)
Dept: FAMILY MEDICINE | Facility: CLINIC | Age: 25
End: 2020-04-29
Payer: COMMERCIAL

## 2020-04-29 DIAGNOSIS — F90.0 ATTENTION DEFICIT HYPERACTIVITY DISORDER (ADHD), PREDOMINANTLY INATTENTIVE TYPE: Primary | ICD-10-CM

## 2020-04-29 RX ORDER — DEXTROAMPHETAMINE SACCHARATE, AMPHETAMINE ASPARTATE MONOHYDRATE, DEXTROAMPHETAMINE SULFATE AND AMPHETAMINE SULFATE 5; 5; 5; 5 MG/1; MG/1; MG/1; MG/1
20 CAPSULE, EXTENDED RELEASE ORAL DAILY
Qty: 30 CAPSULE | Refills: 0 | Status: SHIPPED | OUTPATIENT
Start: 2020-05-03 | End: 2020-05-13 | Stop reason: DRUGHIGH

## 2020-04-29 NOTE — PATIENT INSTRUCTIONS

## 2020-04-29 NOTE — NURSING NOTE
Sergio Harrington  1995      Please answer the questions below, rating yourself on each of the criteria shown using the  scale on the right side of the page. As you answer each question, place an X in the box that  best describes how you have felt and conducted yourself over the past 6 months.     Answers can be:  Never  Rarely  Sometimes  Often  Very Often    1. How often do you have trouble wrapping up the final details of a project,  once the challenging parts have been done? Often    2. How often do you have difficulty getting things in order when you have to do  a task that requires organization? Often    3. How often do you have problems remembering appointments or obligations? Sometimes    4. When you have a task that requires a lot of thought, how often do you avoid  or delay getting started? Often    5. How often do you fidget or squirm with your hands or feet when you have  to sit down for a long time? Sometimes    6. How often do you feel overly active and compelled to do things, like you  were driven by a motor? Sometimes    7. How often do you make careless mistakes when you have to work on a boring or  difficult project? Sometimes    8. How often do you have difficulty keeping your attention when you are doing boring  or repetitive work? Often    9. How often do you have difficulty concentrating on what people say to you,  even when they are speaking to you directly? Very Often    10. How often do you misplace or have difficulty finding things at home or at work? Sometimes    11. How often are you distracted by activity or noise around you? Often    12. How often do you leave your seat in meetings or other situations in which  you are expected to remain seated? Rarely    13. How often do you feel restless or fidgety? Often    14. How often do you have difficulty unwinding and relaxing when you have time   to yourself? Very Often    15. How often do you find yourself talking too much when you are in  social situations? Sometimes    16. When you re in a conversation, how often do you find yourself finishing  the sentences of the people you are talking to, before they can finish  them themselves? Sometimes    17. How often do you have difficulty waiting your turn in situations when  turn taking is required? Often    18. How often do you interrupt others when they are busy? Sometimes       Locurto   pt here with sxs of urinary retention  had recent cervical spine surgery  no fever  has baseline nocturia x 5  Over last 1-2 weeks has 10x/night  no LE weakness no bowel incontinence    Pt has been taking frequent doses of narcotics  for post op pain  exam  Pt with nl LE strength and sensation to pin and touch  b/l  nl rectal tone as per resident  abd post guerrero  soft  nontender card S1S2  freq atrial ectopy  guerrero passed  with post obstructive diuresis in ED   Imp  pt with retention  likely baseline problem exacerbated by medication  Seen by ortho spine who are in agreement  had  consult    Will place in CDU for overnight monitoring of output and electrolytes   fluid replacement

## 2020-04-29 NOTE — PROGRESS NOTES
"Sergio Harrington is a 24 year old male who is being evaluated via a billable video visit.      The patient has been notified of following:     \"This video visit will be conducted via a call between you and your physician/provider. We have found that certain health care needs can be provided without the need for an in-person physical exam.  This service lets us provide the care you need with a video conversation.  If a prescription is necessary we can send it directly to your pharmacy.  If lab work is needed we can place an order for that and you can then stop by our lab to have the test done at a later time.    Video visits are billed at different rates depending on your insurance coverage.  Please reach out to your insurance provider with any questions.    If during the course of the call the physician/provider feels a video visit is not appropriate, you will not be charged for this service.\"    Patient has given verbal consent for Video visit? Yes    How would you like to obtain your AVS? Sussy    Patient would like the video invitation sent by: Send to e-mail at: [email or web address]    Will anyone else be joining your video visit? No        Subjective     Sergio Harrington is a 24 year old male who presents to clinic today for the following health issues:    HPI  ADHD; follow up after starting Adderall 10 mg daily. Denies any side effects. Has noticed an increase in the ability to start tasks. Continues to have difficulty completing tasks.     Video Start Time: 0906    Reviewed and updated as needed this visit by Provider      Review of Systems   ROS COMP: Constitutional, HEENT, cardiovascular, pulmonary, gi and gu systems are negative, except as otherwise noted.      Objective    There were no vitals taken for this visit.  Estimated body mass index is 32.07 kg/m  as calculated from the following:    Height as of 1/24/20: 1.715 m (5' 7.5\").    Weight as of 1/24/20: 94.3 kg (207 lb 12.8 oz).  Physical Exam     GENERAL: " healthy, alert and no distress  EYES: Eyes grossly normal to inspection, conjunctivae and sclerae normal  RESP: no audible wheeze, cough, or visible cyanosis.  No visible retractions or increased work of breathing.  Able to speak fully in complete sentences.  NEURO: Cranial nerves grossly intact, mentation intact and speech normal  PSYCH: mentation appears normal, affect normal/bright, judgement and insight intact, normal speech and appearance well-groomed    Assessment & Plan       ICD-10-CM    1. Attention deficit hyperactivity disorder (ADHD), predominantly inattentive type  F90.0 amphetamine-dextroamphetamine (ADDERALL XR) 20 MG 24 hr capsule        Return in about 2 weeks (around 5/13/2020) for Video Visit.      Video-Visit Details    Type of service:  Video Visit    Video End Time:0916    Originating Location (pt. Location): Home    Distant Location (provider location):  Home     Mode of Communication:  Video Conference via [employer]    Return in about 2 weeks (around 5/13/2020) for Video Visit.     First, upon review and discussion, Sergio will increase his adderall XR to 20 mg daily from 10 mg daily. Next, Sergio will return for follow up in 2 weeks. Finally, he will take drug holidays as able. He verbalizes understanding of the plan.   Reina Salazar, APRN, CNP

## 2020-04-29 NOTE — LETTER
Controlled Medication Agreement for Stimulant Medication    [facility]  -- Controlled Medication Agreement    4/29/2020   Sergio Harrington   1995   [id number]       I understand that my provider is prescribing controlled medications to assist me in managing my ADHD.  The risks, benefits, and side effects of these medications have been explained to me and I agree to the following conditions for this type of treatment.    Stimulant Medication Prescribed: Adderal XR    1.  I will take my medications exactly as prescribed and will not change the medication dosage or schedule without my provider's approval.  Refills will not be given if I  runs out early.     2.  I will keep all regular appointments at this clinic.  If there are three or more missed appointments or appointments canceled less than 2 hours before the scheduled time, my medication may be discontinued.    3.  I understand that prescriptions may only be written for one month at a time, and a written prescription is required each month.  Prescriptions cannot be called in or faxed to the pharmacy.    4.  If the prescription is lost or stolen, replacement is at the discretion of my provider.  I understand that this may mean the prescription might not be replaced.    5.  If I am late for scheduled follow up, I understand that I must make an appointment and that another refill is at the discretion of my provider.  This may mean a prescription for only the amount required until the appointment, regardless of prescription co-pay.  For example, if an appointment is made in 1 week, a prescription might only be written for 7 pills.      I understand that if I violate any of the above conditions, my prescription medications and/or treatment may be terminated.  If the violation includes providing controlled substances to anyone other than to whom the medication is prescribed, a report may be made to my child's physician, pharmacy, and other authorities,  including the police.    I have read this contract and it has been explained to me.  I fully understand the consequences of violating this agreement.    _________________________________/______________/____________________________    Patient signature/Date/Witness  (Patient to sign once able to come into clinic, currently attending virtual visits).

## 2020-05-13 ENCOUNTER — VIRTUAL VISIT (OUTPATIENT)
Dept: FAMILY MEDICINE | Facility: CLINIC | Age: 25
End: 2020-05-13
Payer: COMMERCIAL

## 2020-05-13 DIAGNOSIS — F90.0 ATTENTION DEFICIT HYPERACTIVITY DISORDER (ADHD), PREDOMINANTLY INATTENTIVE TYPE: Primary | ICD-10-CM

## 2020-05-13 RX ORDER — DEXTROAMPHETAMINE SACCHARATE, AMPHETAMINE ASPARTATE MONOHYDRATE, DEXTROAMPHETAMINE SULFATE AND AMPHETAMINE SULFATE 7.5; 7.5; 7.5; 7.5 MG/1; MG/1; MG/1; MG/1
30 CAPSULE, EXTENDED RELEASE ORAL DAILY
Qty: 30 CAPSULE | Refills: 0 | Status: SHIPPED | OUTPATIENT
Start: 2020-07-14 | End: 2020-05-15

## 2020-05-13 NOTE — PROGRESS NOTES
"Sergio Harrington is a 24 year old male who is being evaluated via a billable video visit.      The patient has been notified of following:     \"This video visit will be conducted via a call between you and your physician/provider. We have found that certain health care needs can be provided without the need for an in-person physical exam.  This service lets us provide the care you need with a video conversation.  If a prescription is necessary we can send it directly to your pharmacy.  If lab work is needed we can place an order for that and you can then stop by our lab to have the test done at a later time.    Video visits are billed at different rates depending on your insurance coverage.  Please reach out to your insurance provider with any questions.    If during the course of the call the physician/provider feels a video visit is not appropriate, you will not be charged for this service.\"    Patient has given verbal consent for Video visit? Yes    How would you like to obtain your AVS? Sussy    Patient would like the video invitation sent by: Send to e-mail at: [email or web address]    Will anyone else be joining your video visit? No      Subjective     Sergio Harrington is a 24 year old male who presents today via video visit for the following health issues:  Follow up on ADHD- Sergio has increased his dose of Adderall from 10 mg daily to 20 mg daily. He is not sure he has noticed a big change. He feels about the same as he did when started on Adderall. He denies any side effects. He does feel that the medication lasts from 7 am to 4 pm which covers his work day. He is going to see a psychiatrist today for their opinion on treatment of his ADHD. Sergio understands he should not be receiving Rx from 2 different sources.     HPI    Video Start Time: 0957      Reviewed and updated as needed this visit by Provider    Review of Systems   Constitutional, HEENT, cardiovascular, pulmonary, gi and gu systems are negative, " "except as otherwise noted.      Objective    There were no vitals taken for this visit.  Estimated body mass index is 32.07 kg/m  as calculated from the following:    Height as of 1/24/20: 1.715 m (5' 7.5\").    Weight as of 1/24/20: 94.3 kg (207 lb 12.8 oz).  Physical Exam     GENERAL: Healthy, alert and no distress  EYES: Eyes grossly normal to inspection.  No discharge or erythema, or obvious scleral/conjunctival abnormalities.  RESP: No audible wheeze, cough, or visible cyanosis.  No visible retractions or increased work of breathing.    SKIN: Visible skin clear. No significant rash, abnormal pigmentation or lesions.  NEURO: Cranial nerves grossly intact.  Mentation and speech appropriate for age.  PSYCH: Mentation appears normal, affect normal/bright, judgement and insight intact, normal speech and appearance well-groomed.    Assessment & Plan       ICD-10-CM    1. Attention deficit hyperactivity disorder (ADHD), predominantly inattentive type  F90.0 amphetamine-dextroamphetamine (ADDERALL XR) 30 MG 24 hr capsule     BMI:   Estimated body mass index is 32.07 kg/m  as calculated from the following:    Height as of 1/24/20: 1.715 m (5' 7.5\").    Weight as of 1/24/20: 94.3 kg (207 lb 12.8 oz).     MEDICATIONS:        - Increase Addreall to 30 mg daily    Return in about 2 weeks (around 5/27/2020) for In-Clinic Visit, Video Visit.    Video-Visit Details    Type of service:  Video Visit    Video End Time:1015    Originating Location (pt. Location): Home    Distant Location (provider location):  Home    Platform used for Video Visit: Angie    Return in about 2 weeks (around 5/27/2020) for In-Clinic Visit, Video Visit.   First, increase your Adderall to 30 mg daily. Next, please see Psychiatry and update me on the appt and plan. Next, please return for follow up with me in 2 weeks. He verbalizes understanding of the plan.   Reina Salazar, APRN, CNP          "

## 2020-05-13 NOTE — PATIENT INSTRUCTIONS

## 2020-05-27 ENCOUNTER — VIRTUAL VISIT (OUTPATIENT)
Dept: FAMILY MEDICINE | Facility: CLINIC | Age: 25
End: 2020-05-27
Payer: COMMERCIAL

## 2020-05-27 DIAGNOSIS — F90.0 ATTENTION DEFICIT HYPERACTIVITY DISORDER (ADHD), PREDOMINANTLY INATTENTIVE TYPE: Primary | ICD-10-CM

## 2020-05-27 RX ORDER — DEXTROAMPHETAMINE SACCHARATE, AMPHETAMINE ASPARTATE MONOHYDRATE, DEXTROAMPHETAMINE SULFATE AND AMPHETAMINE SULFATE 7.5; 7.5; 7.5; 7.5 MG/1; MG/1; MG/1; MG/1
30 CAPSULE, EXTENDED RELEASE ORAL DAILY
Qty: 30 CAPSULE | Refills: 0 | Status: SHIPPED | OUTPATIENT
Start: 2020-05-29 | End: 2020-07-23

## 2020-05-27 NOTE — PROGRESS NOTES
"Sergio Harrington is a 24 year old male who is being evaluated via a billable video visit.      The patient has been notified of following:     \"This video visit will be conducted via a call between you and your physician/provider. We have found that certain health care needs can be provided without the need for an in-person physical exam.  This service lets us provide the care you need with a video conversation.  If a prescription is necessary we can send it directly to your pharmacy.  If lab work is needed we can place an order for that and you can then stop by our lab to have the test done at a later time.    Video visits are billed at different rates depending on your insurance coverage.  Please reach out to your insurance provider with any questions.    If during the course of the call the physician/provider feels a video visit is not appropriate, you will not be charged for this service.\"    Patient has given verbal consent for Video visit? Yes    How would you like to obtain your AVS? Sussy    Patient would like the video invitation sent by: Send to e-mail at: [email or web address]    Will anyone else be joining your video visit? No      Subjective     Sergio Harrington is a 24 year old male who presents today via video visit for the following health issues:  Follow up on ADHD, has been taking 30 mg Adderall with excellent affects. He is able to complete tasks more completely, his wife feels that he is a better listener and able to focus on the conversation better. Sergio is very happy with his response and wants to continue on the current dose he is taking.     HPI  Video Start Time: 1500    Reviewed and updated as needed this visit by Provider      Review of Systems   Constitutional, HEENT, cardiovascular, pulmonary, gi and gu systems are negative, except as otherwise noted.      Objective    There were no vitals taken for this visit.  Estimated body mass index is 32.07 kg/m  as calculated from the following:    " "Height as of 1/24/20: 1.715 m (5' 7.5\").    Weight as of 1/24/20: 94.3 kg (207 lb 12.8 oz).  Physical Exam     GENERAL: Healthy, alert and no distress  EYES: Eyes grossly normal to inspection.  No discharge or erythema, or obvious scleral/conjunctival abnormalities.  RESP: No audible wheeze, cough, or visible cyanosis.  No visible retractions or increased work of breathing.    SKIN: Visible skin clear. No significant rash, abnormal pigmentation or lesions.  NEURO: Cranial nerves grossly intact.  Mentation and speech appropriate for age.  PSYCH: Mentation appears normal, affect normal/bright, judgement and insight intact, normal speech and appearance well-groomed.    Assessment & Plan       ICD-10-CM    1. Attention deficit hyperactivity disorder (ADHD), predominantly inattentive type  F90.0 amphetamine-dextroamphetamine (ADDERALL XR) 30 MG 24 hr capsule        BMI:   Estimated body mass index is 32.07 kg/m  as calculated from the following:    Height as of 1/24/20: 1.715 m (5' 7.5\").    Weight as of 1/24/20: 94.3 kg (207 lb 12.8 oz).       Return in about 4 weeks (around 6/24/2020) for Video Visit.    Video-Visit Details    Type of service:  Video Visit    Video End Time:1515    Originating Location (pt. Location): Home    Distant Location (provider location):  [facility]     Platform used for Video Visit: Sergio Parekh is doing well on 30 mg Adderall. He denies any trouble sleeping or problems with appetite. He will return in 4 weeks for follow up on his overall status. He verbalizes understanding of the plan.   Reina Salazar, APRN, CNP          "

## 2020-06-02 NOTE — PATIENT INSTRUCTIONS

## 2020-06-24 ENCOUNTER — VIRTUAL VISIT (OUTPATIENT)
Dept: FAMILY MEDICINE | Facility: CLINIC | Age: 25
End: 2020-06-24
Payer: COMMERCIAL

## 2020-06-24 DIAGNOSIS — F90.0 ATTENTION DEFICIT HYPERACTIVITY DISORDER (ADHD), PREDOMINANTLY INATTENTIVE TYPE: Primary | ICD-10-CM

## 2020-06-24 RX ORDER — DEXTROAMPHETAMINE SACCHARATE, AMPHETAMINE ASPARTATE MONOHYDRATE, DEXTROAMPHETAMINE SULFATE AND AMPHETAMINE SULFATE 5; 5; 5; 5 MG/1; MG/1; MG/1; MG/1
40 CAPSULE, EXTENDED RELEASE ORAL DAILY
Qty: 60 CAPSULE | Refills: 0 | Status: SHIPPED | OUTPATIENT
Start: 2020-06-26 | End: 2020-06-25 | Stop reason: DRUGHIGH

## 2020-06-24 NOTE — PATIENT INSTRUCTIONS

## 2020-06-24 NOTE — PROGRESS NOTES
"Sergio Harrington is a 24 year old male who is being evaluated via a billable video visit.      The patient has been notified of following:     \"This video visit will be conducted via a call between you and your physician/provider. We have found that certain health care needs can be provided without the need for an in-person physical exam.  This service lets us provide the care you need with a video conversation.  If a prescription is necessary we can send it directly to your pharmacy.  If lab work is needed we can place an order for that and you can then stop by our lab to have the test done at a later time.    Video visits are billed at different rates depending on your insurance coverage.  Please reach out to your insurance provider with any questions.    If during the course of the call the physician/provider feels a video visit is not appropriate, you will not be charged for this service.\"    Patient has given verbal consent for Video visit? Yes    Will anyone else be joining your video visit? No      Subjective     Sergio Harrington is a 24 year old male who presents today via video visit for the following health issues:  ADHD: Sergio is doing fair. He is currently taking Adderall XR 30 mg daily. He is able to focus better; however he has not achieved his desired level of improvement. He wants discuss treatment adjustments. He denies weight loss and difficulty sleeping.   HPI    Video Start Time:842      Reviewed and updated as needed this visit by Provider    Review of Systems   Constitutional, HEENT, cardiovascular, pulmonary, gi and gu systems are negative, except as otherwise noted.      Objective      Physical Exam     GENERAL: Healthy, alert and no distress  EYES: Eyes grossly normal to inspection.  No discharge or erythema, or obvious scleral/conjunctival abnormalities.  RESP: No audible wheeze, cough, or visible cyanosis.  No visible retractions or increased work of breathing.    SKIN: Visible skin clear. " "No significant rash, abnormal pigmentation or lesions.  NEURO: Cranial nerves grossly intact.  Mentation and speech appropriate for age.  PSYCH: Mentation appears normal, affect normal/bright, judgement and insight intact, normal speech and appearance well-groomed.    Assessment & Plan       ICD-10-CM    1. Attention deficit hyperactivity disorder (ADHD), predominantly inattentive type  F90.0 amphetamine-dextroamphetamine (ADDERALL XR) 20 MG 24 hr capsule        BMI:   Estimated body mass index is 32.07 kg/m  as calculated from the following:    Height as of 1/24/20: 1.715 m (5' 7.5\").    Weight as of 1/24/20: 94.3 kg (207 lb 12.8 oz).               Return in about 4 weeks (around 7/22/2020) for Video Visit.    Reina Salazar NP  [facility] [[email or web address] [employer]      Video-Visit Details    Type of service:  Video Visit    Video End Time:902    Originating Location (pt. Location): Other Work    Distant Location (provider location):  [email or web address]     Platform used for Video Visit: K[employer]Well    Return in about 4 weeks (around 7/22/2020) for Video Visit.   Sergio is doing fair, he will increase his dose from Adderall XR 30 mg daily to Adderall XR 40 mg daily. He will follow up in one month. He verbalizes understanding of the plan.   DARIA Mcdaniel, CNP          "

## 2020-06-25 RX ORDER — DEXTROAMPHETAMINE SACCHARATE, AMPHETAMINE ASPARTATE MONOHYDRATE, DEXTROAMPHETAMINE SULFATE AND AMPHETAMINE SULFATE 7.5; 7.5; 7.5; 7.5 MG/1; MG/1; MG/1; MG/1
30 CAPSULE, EXTENDED RELEASE ORAL DAILY
Qty: 30 CAPSULE | Refills: 0 | Status: SHIPPED | OUTPATIENT
Start: 2020-06-25 | End: 2020-07-23

## 2020-06-25 RX ORDER — DEXTROAMPHETAMINE SACCHARATE, AMPHETAMINE ASPARTATE MONOHYDRATE, DEXTROAMPHETAMINE SULFATE AND AMPHETAMINE SULFATE 2.5; 2.5; 2.5; 2.5 MG/1; MG/1; MG/1; MG/1
10 CAPSULE, EXTENDED RELEASE ORAL DAILY
Qty: 30 CAPSULE | Refills: 0 | Status: SHIPPED | OUTPATIENT
Start: 2020-06-25 | End: 2020-07-23

## 2020-07-23 ENCOUNTER — VIRTUAL VISIT (OUTPATIENT)
Dept: FAMILY MEDICINE | Facility: CLINIC | Age: 25
End: 2020-07-23
Payer: COMMERCIAL

## 2020-07-23 DIAGNOSIS — F90.0 ATTENTION DEFICIT HYPERACTIVITY DISORDER (ADHD), PREDOMINANTLY INATTENTIVE TYPE: ICD-10-CM

## 2020-07-23 DIAGNOSIS — F90.0 ADHD (ATTENTION DEFICIT HYPERACTIVITY DISORDER), INATTENTIVE TYPE: Primary | ICD-10-CM

## 2020-07-23 RX ORDER — DEXTROAMPHETAMINE SACCHARATE, AMPHETAMINE ASPARTATE MONOHYDRATE, DEXTROAMPHETAMINE SULFATE AND AMPHETAMINE SULFATE 7.5; 7.5; 7.5; 7.5 MG/1; MG/1; MG/1; MG/1
30 CAPSULE, EXTENDED RELEASE ORAL DAILY
Qty: 30 CAPSULE | Refills: 0 | Status: SHIPPED | OUTPATIENT
Start: 2020-07-23 | End: 2020-07-23

## 2020-07-23 RX ORDER — DEXTROAMPHETAMINE SACCHARATE, AMPHETAMINE ASPARTATE MONOHYDRATE, DEXTROAMPHETAMINE SULFATE AND AMPHETAMINE SULFATE 7.5; 7.5; 7.5; 7.5 MG/1; MG/1; MG/1; MG/1
30 CAPSULE, EXTENDED RELEASE ORAL DAILY
Qty: 30 CAPSULE | Refills: 0 | Status: SHIPPED | OUTPATIENT
Start: 2020-08-20 | End: 2020-07-23

## 2020-07-23 RX ORDER — DEXTROAMPHETAMINE SACCHARATE, AMPHETAMINE ASPARTATE MONOHYDRATE, DEXTROAMPHETAMINE SULFATE AND AMPHETAMINE SULFATE 7.5; 7.5; 7.5; 7.5 MG/1; MG/1; MG/1; MG/1
30 CAPSULE, EXTENDED RELEASE ORAL DAILY
Qty: 30 CAPSULE | Refills: 0 | Status: SHIPPED | OUTPATIENT
Start: 2020-09-17 | End: 2020-10-19

## 2020-07-23 RX ORDER — DEXTROAMPHETAMINE SACCHARATE, AMPHETAMINE ASPARTATE MONOHYDRATE, DEXTROAMPHETAMINE SULFATE AND AMPHETAMINE SULFATE 2.5; 2.5; 2.5; 2.5 MG/1; MG/1; MG/1; MG/1
10 CAPSULE, EXTENDED RELEASE ORAL DAILY
Qty: 30 CAPSULE | Refills: 0 | Status: SHIPPED | OUTPATIENT
Start: 2020-07-23 | End: 2020-07-23

## 2020-07-23 RX ORDER — DEXTROAMPHETAMINE SACCHARATE, AMPHETAMINE ASPARTATE MONOHYDRATE, DEXTROAMPHETAMINE SULFATE AND AMPHETAMINE SULFATE 2.5; 2.5; 2.5; 2.5 MG/1; MG/1; MG/1; MG/1
10 CAPSULE, EXTENDED RELEASE ORAL DAILY
Qty: 30 CAPSULE | Refills: 0 | Status: SHIPPED | OUTPATIENT
Start: 2020-09-17 | End: 2020-10-19

## 2020-07-23 RX ORDER — DEXTROAMPHETAMINE SACCHARATE, AMPHETAMINE ASPARTATE MONOHYDRATE, DEXTROAMPHETAMINE SULFATE AND AMPHETAMINE SULFATE 2.5; 2.5; 2.5; 2.5 MG/1; MG/1; MG/1; MG/1
10 CAPSULE, EXTENDED RELEASE ORAL DAILY
Qty: 30 CAPSULE | Refills: 0 | Status: SHIPPED | OUTPATIENT
Start: 2020-08-20 | End: 2020-07-23

## 2020-07-23 NOTE — PROGRESS NOTES
"Sergio Harrington is a 24 year old male who is being evaluated via a billable video visit.      The patient has been notified of following:     \"This video visit will be conducted via a call between you and your physician/provider. We have found that certain health care needs can be provided without the need for an in-person physical exam.  This service lets us provide the care you need with a video conversation.  If a prescription is necessary we can send it directly to your pharmacy.  If lab work is needed we can place an order for that and you can then stop by our lab to have the test done at a later time.    Video visits are billed at different rates depending on your insurance coverage.  Please reach out to your insurance provider with any questions.    If during the course of the call the physician/provider feels a video visit is not appropriate, you will not be charged for this service.\"    Patient has given verbal consent for Video visit? Yes  How would you like to obtain your AVS? MyChart  If you are dropped from the video visit, the video invite should be resent to: Send to e-mail at: [email or web address]  Will anyone else be joining your video visit? No    Subjective     Sergio Harrington is a 24 year old male who presents today via video visit for the following health issues:  Follow up on ADHD. Sergio is doing well. His boss has commented on his level of engagement and initiative as it relates to his work. His wife has stated that he listens better and is more attentive. Sergio feels satisfied with his level of improvement since starting the Adderall. Sergio feels that 40 mg is the correct dose for symptom management.   [facility]    Video Start Time: 0836    Reviewed and updated as needed this visit by Provider    Review of Systems   Constitutional, HEENT, cardiovascular, pulmonary, gi and gu systems are negative, except as otherwise noted.      Objective      Physical Exam     GENERAL: Healthy, alert and no " "distress  EYES: Eyes grossly normal to inspection.  No discharge or erythema, or obvious scleral/conjunctival abnormalities.  RESP: No audible wheeze, cough, or visible cyanosis.  No visible retractions or increased work of breathing.    SKIN: Visible skin clear. No significant rash, abnormal pigmentation or lesions.  NEURO: Cranial nerves grossly intact.  Mentation and speech appropriate for age.  PSYCH: Mentation appears normal, affect normal/bright, judgement and insight intact, normal speech and appearance well-groomed.      Assessment & Plan       ICD-10-CM    1. ADHD (attention deficit hyperactivity disorder), inattentive type  F90.0         BMI:   Estimated body mass index is 32.07 kg/m  as calculated from the following:    Height as of 1/24/20: 1.715 m (5' 7.5\").    Weight as of 1/24/20: 94.3 kg (207 lb 12.8 oz).     Return in about 3 months (around 10/23/2020) for Video Visit.    Video-Visit Details    Type of service:  Video Visit    Video End Time:0850    Originating Location (pt. Location): Home    Distant Location (provider location):  [facility]    Platform used for Video Visit: Angie    Return in about 3 months (around 10/23/2020) for Video Visit.     First, Sergio will continue on 40 mg Adderall XR . He will continue to take 30 mg on days with less tasks and drug holidays as able. He will return in 3 months or as needed for follow up on his overall status. He verbalizes understanding of the plan.   DARIA Mcdaniel, CNP          "

## 2020-07-23 NOTE — PATIENT INSTRUCTIONS

## 2020-10-19 ENCOUNTER — VIRTUAL VISIT (OUTPATIENT)
Dept: FAMILY MEDICINE | Facility: CLINIC | Age: 25
End: 2020-10-19
Payer: COMMERCIAL

## 2020-10-19 DIAGNOSIS — F90.0 ADHD (ATTENTION DEFICIT HYPERACTIVITY DISORDER), INATTENTIVE TYPE: Primary | ICD-10-CM

## 2020-10-19 PROCEDURE — 99213 OFFICE O/P EST LOW 20 MIN: CPT | Mod: 95 | Performed by: NURSE PRACTITIONER

## 2020-10-19 RX ORDER — DEXTROAMPHETAMINE SACCHARATE, AMPHETAMINE ASPARTATE MONOHYDRATE, DEXTROAMPHETAMINE SULFATE AND AMPHETAMINE SULFATE 2.5; 2.5; 2.5; 2.5 MG/1; MG/1; MG/1; MG/1
10 CAPSULE, EXTENDED RELEASE ORAL DAILY
Qty: 30 CAPSULE | Refills: 0 | Status: SHIPPED | OUTPATIENT
Start: 2020-12-17 | End: 2021-01-19

## 2020-10-19 RX ORDER — DEXTROAMPHETAMINE SACCHARATE, AMPHETAMINE ASPARTATE MONOHYDRATE, DEXTROAMPHETAMINE SULFATE AND AMPHETAMINE SULFATE 2.5; 2.5; 2.5; 2.5 MG/1; MG/1; MG/1; MG/1
10 CAPSULE, EXTENDED RELEASE ORAL DAILY
Qty: 30 CAPSULE | Refills: 0 | Status: SHIPPED | OUTPATIENT
Start: 2020-11-19 | End: 2020-10-19

## 2020-10-19 RX ORDER — DEXTROAMPHETAMINE SACCHARATE, AMPHETAMINE ASPARTATE MONOHYDRATE, DEXTROAMPHETAMINE SULFATE AND AMPHETAMINE SULFATE 7.5; 7.5; 7.5; 7.5 MG/1; MG/1; MG/1; MG/1
30 CAPSULE, EXTENDED RELEASE ORAL DAILY
Qty: 30 CAPSULE | Refills: 0 | Status: SHIPPED | OUTPATIENT
Start: 2020-10-19 | End: 2020-10-19

## 2020-10-19 RX ORDER — DEXTROAMPHETAMINE SACCHARATE, AMPHETAMINE ASPARTATE MONOHYDRATE, DEXTROAMPHETAMINE SULFATE AND AMPHETAMINE SULFATE 7.5; 7.5; 7.5; 7.5 MG/1; MG/1; MG/1; MG/1
30 CAPSULE, EXTENDED RELEASE ORAL DAILY
Qty: 30 CAPSULE | Refills: 0 | Status: SHIPPED | OUTPATIENT
Start: 2020-11-19 | End: 2020-10-19

## 2020-10-19 RX ORDER — DEXTROAMPHETAMINE SACCHARATE, AMPHETAMINE ASPARTATE MONOHYDRATE, DEXTROAMPHETAMINE SULFATE AND AMPHETAMINE SULFATE 2.5; 2.5; 2.5; 2.5 MG/1; MG/1; MG/1; MG/1
10 CAPSULE, EXTENDED RELEASE ORAL DAILY
Qty: 30 CAPSULE | Refills: 0 | Status: SHIPPED | OUTPATIENT
Start: 2020-10-19 | End: 2020-10-19

## 2020-10-19 RX ORDER — DEXTROAMPHETAMINE SACCHARATE, AMPHETAMINE ASPARTATE MONOHYDRATE, DEXTROAMPHETAMINE SULFATE AND AMPHETAMINE SULFATE 7.5; 7.5; 7.5; 7.5 MG/1; MG/1; MG/1; MG/1
30 CAPSULE, EXTENDED RELEASE ORAL DAILY
Qty: 30 CAPSULE | Refills: 0 | Status: SHIPPED | OUTPATIENT
Start: 2020-12-17 | End: 2021-01-19

## 2020-10-19 ASSESSMENT — PAIN SCALES - GENERAL: PAINLEVEL: NO PAIN (0)

## 2020-10-19 NOTE — LETTER
[facility]   10/19/20    Patient: Sergio Harrington  YOB: 1995  Medical Record Number: [id number]  CSN: [id number]                                                                              Non-opioid Controlled Substance Agreement    I understand that my care provider has prescribed a controlled substance to help manage my condition(s). I am taking this medicine to help me function or work. I know this is strong medicine, and that it can cause serious side effects. Controlled substances can be sedating, addicting and may cause a dependency on the drug. They can affect my ability to drive or think, and cause depression. They need to be taken exactly as prescribed. Combining controlled substances with certain medicines or chemicals (such as cocaine, sedatives and tranquilizers, sleeping pills, meth) can be dangerous or even fatal. Also, if I stop controlled substances suddenly, I may have severe withdrawal symptoms.  If not helpful, I may be asked to stop them.    The risks, benefits, and side effects of these medicine(s) were explained to me. I agree that:    1. I will take part in other treatments as advised by my care team. This may be psychiatry or counseling, physical therapy, behavioral therapy, group treatment or a referral to a pain clinic. I will reduce or stop my medicine when my care team tells me to do so.  2. I will take my medicines as prescribed. I will not change the dose or schedule unless my care team tells me to. There will be no refills if I  run out early.   I may be contactedwithout warning and asked to complete a urine drug test or pill count at any time.   3. I will keep all my appointments, and understand this is part of the monitoring of controlled substances. My care team may require an office visit for EVERY controlled substance refill. If I miss appointments or don t follow instructions, my care team may stop my medicine.  4. I  will not ask other providers to prescribe controlled substances, and I will not accept controlled substances from other people. If I need another prescribed controlled substance for a new reason, I will tell my care team within 1 business day.  5. I will use one pharmacy to fill all of my controlled substance prescriptions, and it is up to me to make sure that I do not run out of my medicines on weekends or holidays. If my care team is willing to refill my controlled substance prescription without a visit, I must request refills only during office hours, refills may take up to 3 days to process, and it may take up to 5 to 7 days for my medicine to be mailed and ready at my pharmacy. Prescriptions will not be mailed anywhere except my pharmacy.    6. I am responsible for my prescriptions. If the medicine/prescription is lost or stolen, it will not be replaced. I also agree not to share controlled substance medicines with anyone.              [facility]   10/19/20  Patient:  Sergio Harrington  YOB: 1995  Medical Record Number: [id number]  [facility]: [id number]    7. I agree to not use ANY illegal or recreational drugs. This includes marijuana, cocaine, bath salts or other drugs. I agree not to use alcohol unless my care team says I may. I agree to give urine samples whenever asked. If I don t give a urine sample, the care team may stop my medicine.    8. If I enroll in the [place] Medical Marijuana program, I will tell my care team. I will also sign an agreement to share my medical records with my care team.    9. I will bring in my list of medicines (or my medicine bottles) each time I come to the clinic.   10. I will tell my care team right away if I become pregnant or have a new medical problem treated outside of my regular clinic.  11. I understand that this medicine can affect my thinking and judgment. It may be unsafe for me to drive, use machinery and do  dangerous tasks. I will not do any of these things until I know how the medicine affects me. If my dose changes, I will wait to see how it affects me. I will contact my care team if I have concerns about medicine side effects.    I understand that if I do not follow any of the conditions above, my prescriptions or treatment may be stopped.      I agree that my provider, clinic care team, and pharmacy may work with any city, state or federal law enforcement agency that investigates the misuse, sale, or other diversion of my controlled medicine. I will allow my provider to discuss my care with or share a copy of this agreement with any other treating provider, pharmacy or emergency room where I receive care. I agree to give up (waive) any right of privacy or confidentiality with respect to these consents.   I have read this agreement and have asked questions about anything I did not understand.    ____________________________________________________    ____________  ________  Patient signature                                                         Date      Time    ____________________________________________________     ____________  ________  Witness                                                          Date      Time    ____________________________________________________  Provider signature

## 2020-10-19 NOTE — PROGRESS NOTES
"Sergio Harrington is a 25 year old male who is being evaluated via a billable video visit.      The patient has been notified of following:     \"This video visit will be conducted via a call between you and your physician/provider. We have found that certain health care needs can be provided without the need for an in-person physical exam.  This service lets us provide the care you need with a video conversation.  If a prescription is necessary we can send it directly to your pharmacy.  If lab work is needed we can place an order for that and you can then stop by our lab to have the test done at a later time.    Video visits are billed at different rates depending on your insurance coverage.  Please reach out to your insurance provider with any questions.    If during the course of the call the physician/provider feels a video visit is not appropriate, you will not be charged for this service.\"    Patient has given verbal consent for Video visit? Yes  How would you like to obtain your AVS? MyChart  If you are dropped from the video visit, the video invite should be resent to: Other e-mail: Beverley  Will anyone else be joining your video visit? No      Subjective     Sergio Harrington is a 25 year old male who presents today via video visit for the following health issues:    HPI     Sergio is satisfied with his performance on 40 mg of Adderall daily. He feels his weight is stable. His sleep is great, following a consistent wake/sleep schedule. He averages about 7.5 hours of sleep per night. He states that his wife feels that Sergio is still somewhat forgetful and spacey at times especially in the afternoon when his medication is wearing off. He wakes at 0600 and takes his medication right away in the am. No new concerns. Contract has not been signed at this time.     Video Start Time: 0840    Review of Systems   Constitutional, HEENT, cardiovascular, pulmonary, gi and gu systems are negative, except as otherwise " "noted.      Objective         Vitals:  No vitals were obtained today due to virtual visit.    Physical Exam     GENERAL: Healthy, alert and no distress  EYES: Eyes grossly normal to inspection.  No discharge or erythema, or obvious scleral/conjunctival abnormalities.  RESP: No audible wheeze, cough, or visible cyanosis.  No visible retractions or increased work of breathing.    SKIN: Visible skin clear. No significant rash, abnormal pigmentation or lesions.  NEURO: Cranial nerves grossly intact.  Mentation and speech appropriate for age.  PSYCH: Mentation appears normal, affect normal/bright, judgement and insight intact, normal speech and appearance well-groomed.    No diagnostics ordered today.    Assessment & Plan     ADHD (attention deficit hyperactivity disorder), inattentive type  - amphetamine-dextroamphetamine (ADDERALL XR) 10 MG 24 hr capsule  Dispense: 30 capsule; Refill: 0  - amphetamine-dextroamphetamine (ADDERALL XR) 30 MG 24 hr capsule  Dispense: 30 capsule; Refill: 0    BMI:   Estimated body mass index is 32.07 kg/m  as calculated from the following:    Height as of 1/24/20: 1.715 m (5' 7.5\").    Weight as of 1/24/20: 94.3 kg (207 lb 12.8 oz).     Return in about 3 months (around 1/19/2021) for using a video visit.    Reina Salazar NP  [facility] [place]    Video-Visit Details    Type of service:  Video Visit    Video End Time:0849    Originating Location (pt. Location): Home   .  Distant Location (provider location):  [facility] Clinic. [facility]    Platform used for Video Visit: [employer]    First, controlled medication contract will be emailed to Sergio and he will sign this and fax it back to our clinic. Next, he will return in 3 months or as needed for follow up. Finally, his Adderall was refilled at 40 mg daily. He verbalizes understanding of the plan.   DARIA Mcdaniel, CNP        "

## 2020-10-19 NOTE — PATIENT INSTRUCTIONS

## 2020-10-19 NOTE — NURSING NOTE
Chief Complaint   Patient presents with     A.D.H.D     Follow up on Adhd.     ALEC Corbett 8:13 AM  10/19/2020

## 2021-01-03 ENCOUNTER — HEALTH MAINTENANCE LETTER (OUTPATIENT)
Age: 26
End: 2021-01-03

## 2021-01-19 ENCOUNTER — VIRTUAL VISIT (OUTPATIENT)
Dept: FAMILY MEDICINE | Facility: CLINIC | Age: 26
End: 2021-01-19
Payer: COMMERCIAL

## 2021-01-19 DIAGNOSIS — F90.0 ADHD (ATTENTION DEFICIT HYPERACTIVITY DISORDER), INATTENTIVE TYPE: ICD-10-CM

## 2021-01-19 PROCEDURE — 99213 OFFICE O/P EST LOW 20 MIN: CPT | Mod: 95 | Performed by: NURSE PRACTITIONER

## 2021-01-19 RX ORDER — DEXTROAMPHETAMINE SACCHARATE, AMPHETAMINE ASPARTATE MONOHYDRATE, DEXTROAMPHETAMINE SULFATE AND AMPHETAMINE SULFATE 7.5; 7.5; 7.5; 7.5 MG/1; MG/1; MG/1; MG/1
30 CAPSULE, EXTENDED RELEASE ORAL DAILY
Qty: 30 CAPSULE | Refills: 0 | Status: SHIPPED | OUTPATIENT
Start: 2021-01-29 | End: 2021-01-19

## 2021-01-19 RX ORDER — DEXTROAMPHETAMINE SACCHARATE, AMPHETAMINE ASPARTATE MONOHYDRATE, DEXTROAMPHETAMINE SULFATE AND AMPHETAMINE SULFATE 2.5; 2.5; 2.5; 2.5 MG/1; MG/1; MG/1; MG/1
10 CAPSULE, EXTENDED RELEASE ORAL DAILY
Qty: 30 CAPSULE | Refills: 0 | Status: SHIPPED | OUTPATIENT
Start: 2021-02-26 | End: 2021-04-05

## 2021-01-19 RX ORDER — DEXTROAMPHETAMINE SACCHARATE, AMPHETAMINE ASPARTATE MONOHYDRATE, DEXTROAMPHETAMINE SULFATE AND AMPHETAMINE SULFATE 7.5; 7.5; 7.5; 7.5 MG/1; MG/1; MG/1; MG/1
30 CAPSULE, EXTENDED RELEASE ORAL DAILY
Qty: 30 CAPSULE | Refills: 0 | Status: SHIPPED | OUTPATIENT
Start: 2021-02-26 | End: 2021-04-05

## 2021-01-19 RX ORDER — DEXTROAMPHETAMINE SACCHARATE, AMPHETAMINE ASPARTATE MONOHYDRATE, DEXTROAMPHETAMINE SULFATE AND AMPHETAMINE SULFATE 2.5; 2.5; 2.5; 2.5 MG/1; MG/1; MG/1; MG/1
10 CAPSULE, EXTENDED RELEASE ORAL DAILY
Qty: 30 CAPSULE | Refills: 0 | Status: SHIPPED | OUTPATIENT
Start: 2021-01-29 | End: 2021-01-19

## 2021-01-19 NOTE — PATIENT INSTRUCTIONS

## 2021-01-19 NOTE — PROGRESS NOTES
Sergio is a 25 year old who is being evaluated via a billable video visit.      How would you like to obtain your AVS? MyChart  If the video visit is dropped, the invitation should be resent by: Patient   Will anyone else be joining your video visit? No    Video Start Time: 0738      Subjective     Sergio is a 25 year old who presents to clinic today for follow up on his ADHD.  Elevated BP: 120-130/75-85    HPI     ADHD: Weight has not changed, this has been stable. Sleeping 7.5 hours per night. Is taking drug holidays over weekends and recent holiday season. Last controlled med contract signed by patent 12/07/2020.No new symptoms. Sergio is pleased with his ability to focus and concentrate while taking the medication.     Review of Systems   Constitutional, HEENT, cardiovascular, pulmonary, gi and gu systems are negative, except as otherwise noted.      Objective         Vitals:  No vitals were obtained today due to virtual visit.    Physical Exam   GENERAL: Healthy, alert and no distress  EYES: Eyes grossly normal to inspection.  No discharge or erythema, or obvious scleral/conjunctival abnormalities.  RESP: No audible wheeze, cough, or visible cyanosis.  No visible retractions or increased work of breathing.    SKIN: Visible skin clear. No significant rash, abnormal pigmentation or lesions.  NEURO: Cranial nerves grossly intact.  Mentation and speech appropriate for age.  PSYCH: Mentation appears normal, affect normal/bright, judgement and insight intact, normal speech and appearance well-groomed.    Assessment & Plan     1.ADHD    Video-Visit Details    Type of service:  Video Visit    Video End Time:0750    Originating Location (pt. Location): Home    Distant Location (provider location):  [facility]     Platform used for Video Visit: Angie   First, continue taking Adderall XR 40 mg daily. Next, continue to follow up every 3 months or as needed. He verbalizes  understanding of the plan.   DARIA Mcdaniel, CNP

## 2021-04-05 ENCOUNTER — VIRTUAL VISIT (OUTPATIENT)
Dept: FAMILY MEDICINE | Facility: CLINIC | Age: 26
End: 2021-04-05
Payer: COMMERCIAL

## 2021-04-05 DIAGNOSIS — F90.0 ATTENTION DEFICIT HYPERACTIVITY DISORDER (ADHD), PREDOMINANTLY INATTENTIVE TYPE: Primary | ICD-10-CM

## 2021-04-05 PROCEDURE — 99214 OFFICE O/P EST MOD 30 MIN: CPT | Mod: 95 | Performed by: NURSE PRACTITIONER

## 2021-04-05 RX ORDER — DEXTROAMPHETAMINE SACCHARATE, AMPHETAMINE ASPARTATE MONOHYDRATE, DEXTROAMPHETAMINE SULFATE AND AMPHETAMINE SULFATE 2.5; 2.5; 2.5; 2.5 MG/1; MG/1; MG/1; MG/1
10 CAPSULE, EXTENDED RELEASE ORAL DAILY
Qty: 30 CAPSULE | Refills: 0 | Status: SHIPPED | OUTPATIENT
Start: 2021-05-31 | End: 2021-07-13

## 2021-04-05 RX ORDER — DEXTROAMPHETAMINE SACCHARATE, AMPHETAMINE ASPARTATE MONOHYDRATE, DEXTROAMPHETAMINE SULFATE AND AMPHETAMINE SULFATE 2.5; 2.5; 2.5; 2.5 MG/1; MG/1; MG/1; MG/1
10 CAPSULE, EXTENDED RELEASE ORAL DAILY
Qty: 30 CAPSULE | Refills: 0 | Status: SHIPPED | OUTPATIENT
Start: 2021-05-03 | End: 2021-04-05

## 2021-04-05 RX ORDER — DEXTROAMPHETAMINE SACCHARATE, AMPHETAMINE ASPARTATE MONOHYDRATE, DEXTROAMPHETAMINE SULFATE AND AMPHETAMINE SULFATE 7.5; 7.5; 7.5; 7.5 MG/1; MG/1; MG/1; MG/1
30 CAPSULE, EXTENDED RELEASE ORAL DAILY
Qty: 30 CAPSULE | Refills: 0 | Status: SHIPPED | OUTPATIENT
Start: 2021-05-31 | End: 2021-07-13

## 2021-04-05 RX ORDER — DEXTROAMPHETAMINE SACCHARATE, AMPHETAMINE ASPARTATE MONOHYDRATE, DEXTROAMPHETAMINE SULFATE AND AMPHETAMINE SULFATE 2.5; 2.5; 2.5; 2.5 MG/1; MG/1; MG/1; MG/1
10 CAPSULE, EXTENDED RELEASE ORAL DAILY
Qty: 30 CAPSULE | Refills: 0 | Status: SHIPPED | OUTPATIENT
Start: 2021-04-05 | End: 2021-04-05

## 2021-04-05 RX ORDER — DEXTROAMPHETAMINE SACCHARATE, AMPHETAMINE ASPARTATE MONOHYDRATE, DEXTROAMPHETAMINE SULFATE AND AMPHETAMINE SULFATE 7.5; 7.5; 7.5; 7.5 MG/1; MG/1; MG/1; MG/1
30 CAPSULE, EXTENDED RELEASE ORAL DAILY
Qty: 30 CAPSULE | Refills: 0 | Status: SHIPPED | OUTPATIENT
Start: 2021-05-03 | End: 2021-04-05

## 2021-04-05 RX ORDER — DEXTROAMPHETAMINE SACCHARATE, AMPHETAMINE ASPARTATE MONOHYDRATE, DEXTROAMPHETAMINE SULFATE AND AMPHETAMINE SULFATE 7.5; 7.5; 7.5; 7.5 MG/1; MG/1; MG/1; MG/1
30 CAPSULE, EXTENDED RELEASE ORAL DAILY
Qty: 30 CAPSULE | Refills: 0 | Status: SHIPPED | OUTPATIENT
Start: 2021-04-05 | End: 2021-04-05

## 2021-04-05 NOTE — NURSING NOTE
Chief Complaint   Patient presents with     Recheck Medication     Patient calls in for medication refills.         Cyrus Hernández MA on 4/5/2021 at 12:39 PM

## 2021-04-05 NOTE — PROGRESS NOTES
Sergio is a 25 year old who is being evaluated via a billable video visit.      How would you like to obtain your AVS? MyChart  If the video visit is dropped, the invitation should be resent by: Text to cell phone: [phone]  Will anyone else be joining your video visit? No    Video Start Time:1250    Subjective   Sergio is a 25 year old who presents for the following health issues     HPI     Adhd: Symptoms are managed well on Adderall XR, 40 mg daily. He takes drug holidays on the weekends. No new concerns.     Review of Systems   Constitutional, HEENT, cardiovascular, pulmonary, gi and gu systems are negative, except as otherwise noted.      Objective           Vitals:  No vitals were obtained today due to virtual visit.    Physical Exam   GENERAL: Healthy, alert and no distress  EYES: Eyes grossly normal to inspection.  No discharge or erythema, or obvious scleral/conjunctival abnormalities.  RESP: No audible wheeze, cough, or visible cyanosis.  No visible retractions or increased work of breathing.    SKIN: Visible skin clear. No significant rash, abnormal pigmentation or lesions.  NEURO: Cranial nerves grossly intact.  Mentation and speech appropriate for age.  PSYCH: Mentation appears normal, affect normal/bright, judgement and insight intact, normal speech and appearance well-groomed.    No diagnostics completed today.     Video-Visit Details    Type of service:  Video Visit    Video End Time:1310    Originating Location (pt. Location): Home    Distant Location (provider location):  [facility] [place]     Platform used for Video Visit: [facility]   Assessment & Plan     Attention deficit hyperactivity disorder (ADHD), predominantly inattentive type    - amphetamine-dextroamphetamine (ADDERALL XR) 10 MG 24 hr capsule; Take 1 capsule (10 mg) by mouth daily Total daily dose 40 mg.  - amphetamine-dextroamphetamine (ADDERALL XR) 30 MG 24 hr capsule; Take 1 capsule (30 mg)  by mouth daily Total daily dose 40 mg.    Return in about 3 months (around 7/5/2021) for using a video visit.    No changes noted today, no new orders. Continue on current dose of Adderall, monitor symptoms. Continue to return every 3 months. Drug test PRN. Contract annually. He verbalizes understanding of the plan.   DARIA Mcdaniel, CNP

## 2021-04-06 ENCOUNTER — TELEPHONE (OUTPATIENT)
Dept: FAMILY MEDICINE | Facility: CLINIC | Age: 26
End: 2021-04-06

## 2021-04-06 NOTE — TELEPHONE ENCOUNTER
LVM for pt with scheduling number    ----- Message from Reina Salazar NP sent at 4/5/2021  1:05 PM CDT -----  Please call kathy and help him to schedule an appt with me in 3 months for an in person annual physical exam. Thanks.

## 2021-04-06 NOTE — PATIENT INSTRUCTIONS

## 2021-04-07 ENCOUNTER — TELEPHONE (OUTPATIENT)
Dept: FAMILY MEDICINE | Facility: CLINIC | Age: 26
End: 2021-04-07

## 2021-04-07 NOTE — TELEPHONE ENCOUNTER
2nd VM left for pt.    ----- Message from Reina Salazar NP sent at 4/5/2021  1:05 PM CDT -----  Please call kathy and help him to schedule an appt with me in 3 months for an in person annual physical exam. Thanks.

## 2021-04-25 ENCOUNTER — HEALTH MAINTENANCE LETTER (OUTPATIENT)
Age: 26
End: 2021-04-25

## 2021-07-05 ASSESSMENT — ANXIETY QUESTIONNAIRES
7. FEELING AFRAID AS IF SOMETHING AWFUL MIGHT HAPPEN: NOT AT ALL
2. NOT BEING ABLE TO STOP OR CONTROL WORRYING: NOT AT ALL
GAD7 TOTAL SCORE: 5
5. BEING SO RESTLESS THAT IT IS HARD TO SIT STILL: SEVERAL DAYS
6. BECOMING EASILY ANNOYED OR IRRITABLE: SEVERAL DAYS
3. WORRYING TOO MUCH ABOUT DIFFERENT THINGS: NOT AT ALL
1. FEELING NERVOUS, ANXIOUS, OR ON EDGE: SEVERAL DAYS
4. TROUBLE RELAXING: MORE THAN HALF THE DAYS
GAD7 TOTAL SCORE: 5
7. FEELING AFRAID AS IF SOMETHING AWFUL MIGHT HAPPEN: NOT AT ALL

## 2021-07-07 ENCOUNTER — OFFICE VISIT (OUTPATIENT)
Dept: FAMILY MEDICINE | Facility: CLINIC | Age: 26
End: 2021-07-07
Payer: COMMERCIAL

## 2021-07-07 VITALS
HEIGHT: 68 IN | WEIGHT: 210.3 LBS | DIASTOLIC BLOOD PRESSURE: 105 MMHG | BODY MASS INDEX: 31.87 KG/M2 | SYSTOLIC BLOOD PRESSURE: 155 MMHG | HEART RATE: 86 BPM | OXYGEN SATURATION: 99 % | TEMPERATURE: 99.1 F

## 2021-07-07 DIAGNOSIS — Z13.220 SCREENING FOR CHOLESTEROL LEVEL: ICD-10-CM

## 2021-07-07 DIAGNOSIS — Z00.00 ANNUAL PHYSICAL EXAM: Primary | ICD-10-CM

## 2021-07-07 PROCEDURE — 99395 PREV VISIT EST AGE 18-39: CPT | Performed by: NURSE PRACTITIONER

## 2021-07-07 ASSESSMENT — PATIENT HEALTH QUESTIONNAIRE - PHQ9
SUM OF ALL RESPONSES TO PHQ QUESTIONS 1-9: 1
5. POOR APPETITE OR OVEREATING: SEVERAL DAYS

## 2021-07-07 ASSESSMENT — ANXIETY QUESTIONNAIRES
1. FEELING NERVOUS, ANXIOUS, OR ON EDGE: SEVERAL DAYS
5. BEING SO RESTLESS THAT IT IS HARD TO SIT STILL: NOT AT ALL
6. BECOMING EASILY ANNOYED OR IRRITABLE: NOT AT ALL
GAD7 TOTAL SCORE: 2
3. WORRYING TOO MUCH ABOUT DIFFERENT THINGS: NOT AT ALL
7. FEELING AFRAID AS IF SOMETHING AWFUL MIGHT HAPPEN: NOT AT ALL
2. NOT BEING ABLE TO STOP OR CONTROL WORRYING: NOT AT ALL

## 2021-07-07 ASSESSMENT — PAIN SCALES - GENERAL: PAINLEVEL: NO PAIN (0)

## 2021-07-07 ASSESSMENT — MIFFLIN-ST. JEOR: SCORE: 1908.41

## 2021-07-07 NOTE — PROGRESS NOTES
HPI       Sergio Harrington is a 25 year old male who presents for follow up of concern(s) listed below:.  Chief Complaint   Patient presents with     Physical     Pt is here for a physical.     Problem, Medication and Allergy Lists were reviewed and updated if needed.   There are no active problems to display for this patient.    Current Outpatient Medications   Medication Sig Dispense Refill     amphetamine-dextroamphetamine (ADDERALL XR) 10 MG 24 hr capsule Take 1 capsule (10 mg) by mouth daily Total daily dose 40 mg. 30 capsule 0     amphetamine-dextroamphetamine (ADDERALL XR) 30 MG 24 hr capsule Take 1 capsule (30 mg) by mouth daily Total daily dose 40 mg. 30 capsule 0    No Known Allergies.    Patient is an established patient of this clinic.  Past Medical History:   Diagnosis Date     Asthma      Depression      Family History   Problem Relation Age of Onset     No Known Problems Mother      No Known Problems Father      No Known Problems Sister      Atrial fibrillation Maternal Grandmother      Pacemaker Maternal Grandmother      Melanoma Maternal Grandmother      Myocardial Infarction Maternal Grandfather      Kidney Disease Maternal Grandfather      Dementia Paternal Grandmother      Diabetes Type 2  Paternal Grandfather      Social History     Socioeconomic History     Marital status:      Spouse name: Arnold     Number of children: 0     Years of education: 17     Highest education level: Bachelor's degree (e.g., BA, AB, BS)   Occupational History     None   Social Needs     Financial resource strain: None     Food insecurity     Worry: None     Inability: None     Transportation needs     Medical: None     Non-medical: None   Tobacco Use     Smoking status: Never Smoker     Smokeless tobacco: Never Used   Substance and Sexual Activity     Alcohol use: Yes     Comment: 3-4 times monthly, 1-2 beers each time     Drug use: Yes     Types: Marijuana     Comment: occasional use     Sexual  "activity: Yes     Partners: Female     Birth control/protection: I.U.D.   Lifestyle     Physical activity     Days per week: None     Minutes per session: None     Stress: None   Relationships     Social connections     Talks on phone: None     Gets together: None     Attends Yazdanism service: None     Active member of club or organization: None     Attends meetings of clubs or organizations: None     Relationship status: None     Intimate partner violence     Fear of current or ex partner: None     Emotionally abused: None     Physically abused: None     Forced sexual activity: None   Other Topics Concern     None   Social History Narrative    Grew up in [place], [place].           Review of Systems:   Skin: negative for pigmentation, acne, rash  Eyes: negative for visual blurring, double vision, eye pain  Ears/Nose/Throat: negative for nasal congestion, hearing loss, frequent URI's  Respiratory: No shortness of breath, dyspnea on exertion, cough, or hemoptysis.  Cardiovascular: negative for palpitations, irregular heart beat and chest pain  Gastrointestinal: negative for poor appetite, abdominal pain and diarrhea  Genitourinary: negative for dysuria, frequency and urgency  Musculoskeletal: negative for joint pain, joint swelling and joint stiffness  Neurologic: negative for headaches, local weakness, numbness or tingling of hands and numbness or tingling of feet  Psychiatric: positive for negative and excessive stress  Hematologic/Lymphatic/Immunologic: negative for, anemia, chills and fever  Endocrine: negative for cold intolerance, heat intolerance and night sweats              Physical Exam:     Vitals:    07/07/21 1333 07/07/21 1335   BP: (!) 154/105 (!) 155/105   Pulse: 86    Temp: 99.1  F (37.3  C)    TempSrc: Oral    SpO2: 99%    Weight: 95.4 kg (210 lb 4.8 oz)    Height: 1.727 m (5' 8\")      Body mass index is 31.98 kg/m .  Vitals were reviewed and blood pressure was elevated.      Physical " Exam  Constitutional: Healthy, alert and no distress.  Head: Normocephalic. No masses, lesions, tenderness or abnormalities  Neck: Neck supple. No adenopathy. Thyroid symmetric, normal size,, Carotids without bruits.  ENT: ENT exam normal, no neck nodes or sinus tenderness  Cardiovascular: RRR. No murmurs, clicks gallops or rub  Respiratory: Good diaphragmatic excursion. Lungs clear upon auscultation.  Gastrointestinal: Abdomen soft, non-tender. BS normal. No masses, organomegaly  : Deferred  Musculoskeletal: Extremities normal- no gross deformities noted, gait normal and normal muscle tone.  Skin: No suspicious lesions or rashes.  Neurologic: Gait normal. Sensation grossly WNL.  Psychiatric: mentation appears normal and affect normal/bright.  Hematologic/Lymphatic/Immunologic: Normal cervical lymph nodes.      Results:   Will have fasting labs when able.   Assessment and Plan     Sergio was seen today for physical.    Diagnoses and all orders for this visit:    Annual physical exam    Screening for cholesterol level  -     Lipid panel reflex to direct LDL Fasting; Future    1. Immunizations: Pfizer COVID-19 2 of 2 completed. HPV vaccination never done- discussed with patient, provided educational materials, and he will follow-up. All other immunizations up-to-date.  2. Elevated BMI: Discussed exercise, diet, sleep, stress with patient. Continue to follow.  3. Elevated BP: Patient has history of white coat syndrome with hypertension and normal blood pressure readings at home. Plan for patient to take blood pressure 3x/week with home blood pressure cuff, record readings, and follow-up with Reina HUFF CNP in 4-6 weeks. He will bring his home blood pressure cuff for the follow-up visit.   4. ADHD: Continue to take Adderall as prescribed. No adverse side effects reported e.g. headache, poor appetite, weight loss, sleep difficulties, constipation.  5. Labs: Lipid panel when fasting (future, non-urgent)  6.  Follow up in 4-6 weeks with Reina HUFF, TERESA.    There are no discontinued medications.    Options for treatment and follow-up care were reviewed with the patient. Sergio Harrington engaged in the decision making process and verbalized understanding of the options discussed and agreed with the final plan.  Hannah Alejandra DNP student, July 7, 2021 1:59 PM   I was present with the NP student who participated in the service and in the documentation of the services provided. I have verified the history and personally performed the physical exam and medical decision making, as documented by the student and edited by me.  DARIA Mcdaniel, CNP

## 2021-07-07 NOTE — NURSING NOTE
25 year old  Chief Complaint   Patient presents with     Physical     Pt is here for a physical.       Felisha De Leon, ALEC  July 7, 2021 1:26 PM

## 2021-07-07 NOTE — PATIENT INSTRUCTIONS
[facility] Medication Refill Request Information:  * Please contact your pharmacy regarding ANY request for medication refills.  **  Clinic Prescription Fax = [phone]  * Please allow 3 business days for routine medication refills.  * Please allow 5 business days for controlled substance medication refills.     [facility] Test Result notification information:  *You will be notified with in 7-10 days of your appointment day regarding the results of your test.  If you are on MyChart you will be notified as soon as the provider has reviewed the results and signed off on them.    [facility]: [phone]     If you have questions regarding Covid-19 and the Covid-19 vaccine, please visit this website.    [email or web address]      Patient Education     Understanding HPV (Human Papillomavirus)  HPV (human papillomavirus) is a virus that causes warts. It can be hard to detect, so many people never even know they have it. Some types (strains) of HPV may cause warts on the hands, legs, or other parts of the body. These can spread from person to person. Other strains of HPV cause warts in the genital area. A few of these strains can cause certain cancers:    Cancers of the cervix, vagina, and vulva in women    Cancers of the penis in men    Cancers of the anus and back of the throat, including the base of the tongue and tonsils in both women and men  . Treating genital forms of HPV now can help prevent serious health problems in the future.  How was I infected?  HPV is passed from person to person through contact with infected skin. Everyone with HPV has a different experience. Some people notice genital warts (condyloma) within a few months of exposure. In other people, warts take years to appear or may never appear. This makes it almost impossible to know when or by whom you were infected.  How warts form  HPV lives inside skin and mucous membrane  (including in the mouth and vagina). The virus can make skin cells reproduce more often than they should. These extra skin cells build up into warts.  1. HPV invades the skin.  2. DNA from the virus enters skin cells.  3. HPV causes infected skin cells to multiply and form warts.  4. The virus sheds, allowing it to be passed to others.  [employer] last reviewed this educational content on 6/1/2019 2000-2021 [employer]. All rights reserved. This information is not intended as a substitute for professional medical care. Always follow your healthcare professional's instructions.

## 2021-07-07 NOTE — LETTER
[facility]  -- Controlled Medication Agreement  --  2021   Sergio Harrington   :1995 ; F[facility] MRN: [id number]     I understand that my provider is prescribing controlled medication to assist me in managing my adhd that has not responded to other treatments.  These medications are intended to improve function and/or ability to work.  The risks, benefits, and side effects or these medications have been explained to me and I agree to the following conditions for this type of treatment.  1. I will participate in other treatments that my provider recommends.  I will be ready to taper or discontinue medications as other reasonable and effective treatments become available.   2. I will take my medications exactly as prescribed and will not change the medication dosage or schedule without my provider s approval.  Refills will not be given if I  run out early .  3. I will keep all regular appointments at the clinic.  If I have three or more missed or cancelled appointments my medications may be discontinued.  4. I will not request or accept prescriptions for controlled substances from other physicians or individuals for my adderall.  If I develop another condition that requires the prescription of a controlled medication or if I am hospitalized for any reason, I will inform the clinic within one business day of receiving any treatment or medications.  5. I will designate one pharmacy where all of my prescriptions will be filled.  6. I will bring in the containers of all medications prescribed each time I see my provider or a nurse even if there is no medication remaining.  This must be the original container from the pharmacy.  7. Refills of controlled medications will be made only during regular office hours, during a scheduled appointment with my provider or nurse.   8. I am responsible for my prescriptions.  If the medication is lost or stolen, I understand it will not be replaced.  9. I agree to  abstain from all illegal and recreational drugs and will provide urine or blood specimens to monitor my compliance.  10. I understand that controlled medications can affect my thinking and judgment and may interfere with my ability to drive.  I will not drive if I have this concern and will not drive if any dosage adjustments are made until my body has adjusted.    -I understand that if I violate any of the above conditions my prescription medications and/or treatment may be terminated.  If the violation includes obtaining any controlled substances from other healthcare providers or individuals a report may be made to my physician, pharmacy, and other authorities including the police.  -I have read this agreement and it has been explained to me.  I fully understand the consequences of violating this agreement.      _________________                             ___________                _________________       Patient Signature                                Date                              Witness    _________________  Reina Salazar NP

## 2021-10-10 ENCOUNTER — HEALTH MAINTENANCE LETTER (OUTPATIENT)
Age: 26
End: 2021-10-10

## 2022-09-18 ENCOUNTER — HEALTH MAINTENANCE LETTER (OUTPATIENT)
Age: 27
End: 2022-09-18

## 2023-10-08 ENCOUNTER — HEALTH MAINTENANCE LETTER (OUTPATIENT)
Age: 28
End: 2023-10-08

## 2024-10-29 ASSESSMENT — ANXIETY QUESTIONNAIRES: GAD7 TOTAL SCORE: 4

## 2024-11-04 ASSESSMENT — ANXIETY QUESTIONNAIRES: GAD7 TOTAL SCORE: 2
